# Patient Record
Sex: FEMALE | Race: OTHER | NOT HISPANIC OR LATINO
[De-identification: names, ages, dates, MRNs, and addresses within clinical notes are randomized per-mention and may not be internally consistent; named-entity substitution may affect disease eponyms.]

---

## 2019-10-04 ENCOUNTER — RESULT REVIEW (OUTPATIENT)
Age: 74
End: 2019-10-04

## 2019-10-04 ENCOUNTER — OUTPATIENT (OUTPATIENT)
Dept: OUTPATIENT SERVICES | Facility: HOSPITAL | Age: 74
LOS: 1 days | End: 2019-10-04
Payer: MEDICARE

## 2019-10-04 DIAGNOSIS — K63.5 POLYP OF COLON: ICD-10-CM

## 2019-10-04 DIAGNOSIS — C18.9 MALIGNANT NEOPLASM OF COLON, UNSPECIFIED: ICD-10-CM

## 2019-10-04 DIAGNOSIS — D12.6 BENIGN NEOPLASM OF COLON, UNSPECIFIED: ICD-10-CM

## 2019-10-04 PROCEDURE — 88321 CONSLTJ&REPRT SLD PREP ELSWR: CPT

## 2019-10-07 LAB — SURGICAL PATHOLOGY STUDY: SIGNIFICANT CHANGE UP

## 2019-10-09 VITALS
HEART RATE: 74 BPM | TEMPERATURE: 97 F | RESPIRATION RATE: 16 BRPM | WEIGHT: 197.98 LBS | OXYGEN SATURATION: 98 % | SYSTOLIC BLOOD PRESSURE: 150 MMHG | HEIGHT: 65 IN | DIASTOLIC BLOOD PRESSURE: 77 MMHG

## 2019-10-09 NOTE — PATIENT PROFILE ADULT - FALL HARM RISK
other/patient states she is "wobbly on feet" sometimes patient states she is "wobbly on feet" sometimes/surgery

## 2019-10-10 ENCOUNTER — INPATIENT (INPATIENT)
Facility: HOSPITAL | Age: 74
LOS: 6 days | Discharge: NURSING, REHAB OR LONG-TERM CARE | DRG: 375 | End: 2019-10-17
Attending: SURGERY | Admitting: SURGERY
Payer: MEDICARE

## 2019-10-10 ENCOUNTER — RESULT REVIEW (OUTPATIENT)
Age: 74
End: 2019-10-10

## 2019-10-10 DIAGNOSIS — Z98.890 OTHER SPECIFIED POSTPROCEDURAL STATES: Chronic | ICD-10-CM

## 2019-10-10 DIAGNOSIS — Z98.41 CATARACT EXTRACTION STATUS, RIGHT EYE: Chronic | ICD-10-CM

## 2019-10-10 LAB
ANION GAP SERPL CALC-SCNC: 16 MMOL/L — SIGNIFICANT CHANGE UP (ref 5–17)
APTT BLD: 31 SEC — SIGNIFICANT CHANGE UP (ref 27.5–36.3)
BASOPHILS # BLD AUTO: 0.02 K/UL — SIGNIFICANT CHANGE UP (ref 0–0.2)
BASOPHILS NFR BLD AUTO: 0.2 % — SIGNIFICANT CHANGE UP (ref 0–2)
BLD GP AB SCN SERPL QL: NEGATIVE — SIGNIFICANT CHANGE UP
BUN SERPL-MCNC: 12 MG/DL — SIGNIFICANT CHANGE UP (ref 7–23)
CALCIUM SERPL-MCNC: 7.7 MG/DL — LOW (ref 8.4–10.5)
CHLORIDE SERPL-SCNC: 98 MMOL/L — SIGNIFICANT CHANGE UP (ref 96–108)
CO2 SERPL-SCNC: 21 MMOL/L — LOW (ref 22–31)
CREAT SERPL-MCNC: 0.8 MG/DL — SIGNIFICANT CHANGE UP (ref 0.5–1.3)
EOSINOPHIL # BLD AUTO: 0 K/UL — SIGNIFICANT CHANGE UP (ref 0–0.5)
EOSINOPHIL NFR BLD AUTO: 0 % — SIGNIFICANT CHANGE UP (ref 0–6)
GLUCOSE SERPL-MCNC: 131 MG/DL — HIGH (ref 70–99)
HCT VFR BLD CALC: 28 % — LOW (ref 34.5–45)
HGB BLD-MCNC: 9.3 G/DL — LOW (ref 11.5–15.5)
IMM GRANULOCYTES NFR BLD AUTO: 0.8 % — SIGNIFICANT CHANGE UP (ref 0–1.5)
INR BLD: 1.12 — SIGNIFICANT CHANGE UP (ref 0.88–1.16)
LYMPHOCYTES # BLD AUTO: 0.86 K/UL — LOW (ref 1–3.3)
LYMPHOCYTES # BLD AUTO: 8.5 % — LOW (ref 13–44)
MCHC RBC-ENTMCNC: 30.7 PG — SIGNIFICANT CHANGE UP (ref 27–34)
MCHC RBC-ENTMCNC: 33.2 GM/DL — SIGNIFICANT CHANGE UP (ref 32–36)
MCV RBC AUTO: 92.4 FL — SIGNIFICANT CHANGE UP (ref 80–100)
MONOCYTES # BLD AUTO: 0.25 K/UL — SIGNIFICANT CHANGE UP (ref 0–0.9)
MONOCYTES NFR BLD AUTO: 2.5 % — SIGNIFICANT CHANGE UP (ref 2–14)
NEUTROPHILS # BLD AUTO: 8.85 K/UL — HIGH (ref 1.8–7.4)
NEUTROPHILS NFR BLD AUTO: 88 % — HIGH (ref 43–77)
NRBC # BLD: 0 /100 WBCS — SIGNIFICANT CHANGE UP (ref 0–0)
PLATELET # BLD AUTO: 139 K/UL — LOW (ref 150–400)
POTASSIUM SERPL-MCNC: 3.6 MMOL/L — SIGNIFICANT CHANGE UP (ref 3.5–5.3)
POTASSIUM SERPL-SCNC: 3.6 MMOL/L — SIGNIFICANT CHANGE UP (ref 3.5–5.3)
PROTHROM AB SERPL-ACNC: 12.7 SEC — SIGNIFICANT CHANGE UP (ref 10–12.9)
RBC # BLD: 3.03 M/UL — LOW (ref 3.8–5.2)
RBC # FLD: 12.8 % — SIGNIFICANT CHANGE UP (ref 10.3–14.5)
RH IG SCN BLD-IMP: POSITIVE — SIGNIFICANT CHANGE UP
SODIUM SERPL-SCNC: 135 MMOL/L — SIGNIFICANT CHANGE UP (ref 135–145)
WBC # BLD: 10.06 K/UL — SIGNIFICANT CHANGE UP (ref 3.8–10.5)
WBC # FLD AUTO: 10.06 K/UL — SIGNIFICANT CHANGE UP (ref 3.8–10.5)

## 2019-10-10 RX ORDER — BUPIVACAINE 13.3 MG/ML
20 INJECTION, SUSPENSION, LIPOSOMAL INFILTRATION ONCE
Refills: 0 | Status: DISCONTINUED | OUTPATIENT
Start: 2019-10-10 | End: 2019-10-17

## 2019-10-10 RX ORDER — SODIUM CHLORIDE 9 MG/ML
1000 INJECTION, SOLUTION INTRAVENOUS
Refills: 0 | Status: DISCONTINUED | OUTPATIENT
Start: 2019-10-10 | End: 2019-10-11

## 2019-10-10 RX ORDER — ACETAMINOPHEN 500 MG
650 TABLET ORAL ONCE
Refills: 0 | Status: COMPLETED | OUTPATIENT
Start: 2019-10-10 | End: 2019-10-10

## 2019-10-10 RX ORDER — GABAPENTIN 400 MG/1
600 CAPSULE ORAL ONCE
Refills: 0 | Status: COMPLETED | OUTPATIENT
Start: 2019-10-10 | End: 2019-10-10

## 2019-10-10 RX ORDER — ALVIMOPAN 12 MG/1
12 CAPSULE ORAL ONCE
Refills: 0 | Status: COMPLETED | OUTPATIENT
Start: 2019-10-10 | End: 2019-10-10

## 2019-10-10 RX ORDER — ACETAMINOPHEN 500 MG
650 TABLET ORAL EVERY 6 HOURS
Refills: 0 | Status: DISCONTINUED | OUTPATIENT
Start: 2019-10-10 | End: 2019-10-17

## 2019-10-10 RX ORDER — SODIUM CHLORIDE 9 MG/ML
1000 INJECTION, SOLUTION INTRAVENOUS ONCE
Refills: 0 | Status: COMPLETED | OUTPATIENT
Start: 2019-10-10 | End: 2019-10-10

## 2019-10-10 RX ORDER — ALVIMOPAN 12 MG/1
12 CAPSULE ORAL
Refills: 0 | Status: DISCONTINUED | OUTPATIENT
Start: 2019-10-10 | End: 2019-10-17

## 2019-10-10 RX ORDER — SIMVASTATIN 20 MG/1
1 TABLET, FILM COATED ORAL
Qty: 0 | Refills: 0 | DISCHARGE

## 2019-10-10 RX ORDER — ONDANSETRON 8 MG/1
4 TABLET, FILM COATED ORAL EVERY 6 HOURS
Refills: 0 | Status: DISCONTINUED | OUTPATIENT
Start: 2019-10-10 | End: 2019-10-17

## 2019-10-10 RX ORDER — ENOXAPARIN SODIUM 100 MG/ML
40 INJECTION SUBCUTANEOUS ONCE
Refills: 0 | Status: COMPLETED | OUTPATIENT
Start: 2019-10-10 | End: 2019-10-10

## 2019-10-10 RX ORDER — GABAPENTIN 400 MG/1
300 CAPSULE ORAL EVERY 6 HOURS
Refills: 0 | Status: DISCONTINUED | OUTPATIENT
Start: 2019-10-10 | End: 2019-10-17

## 2019-10-10 RX ORDER — HYDROMORPHONE HYDROCHLORIDE 2 MG/ML
30 INJECTION INTRAMUSCULAR; INTRAVENOUS; SUBCUTANEOUS
Refills: 0 | Status: DISCONTINUED | OUTPATIENT
Start: 2019-10-10 | End: 2019-10-13

## 2019-10-10 RX ORDER — ATENOLOL 25 MG/1
50 TABLET ORAL DAILY
Refills: 0 | Status: DISCONTINUED | OUTPATIENT
Start: 2019-10-10 | End: 2019-10-17

## 2019-10-10 RX ADMIN — SODIUM CHLORIDE 2000 MILLILITER(S): 9 INJECTION, SOLUTION INTRAVENOUS at 23:40

## 2019-10-10 RX ADMIN — ENOXAPARIN SODIUM 40 MILLIGRAM(S): 100 INJECTION SUBCUTANEOUS at 13:29

## 2019-10-10 RX ADMIN — ALVIMOPAN 12 MILLIGRAM(S): 12 CAPSULE ORAL at 13:29

## 2019-10-10 RX ADMIN — Medication 650 MILLIGRAM(S): at 13:28

## 2019-10-10 RX ADMIN — GABAPENTIN 600 MILLIGRAM(S): 400 CAPSULE ORAL at 13:28

## 2019-10-10 RX ADMIN — HYDROMORPHONE HYDROCHLORIDE 30 MILLILITER(S): 2 INJECTION INTRAMUSCULAR; INTRAVENOUS; SUBCUTANEOUS at 20:28

## 2019-10-10 RX ADMIN — Medication 650 MILLIGRAM(S): at 13:29

## 2019-10-10 NOTE — PROGRESS NOTE ADULT - SUBJECTIVE AND OBJECTIVE BOX
POST-OPERATIVE NOTE    Procedure: Laparoscopic assisted low anterior     Diagnosis/Indication: Colorectal cancer    Surgeon:     S: Pt with fresh clots of blood per rectum and scant liquid blood, for a total of two episodes while in PACU. Postop Hgb 9.3 from 11.5 preop. Pt does not have any other complaints and says that she feels at baseline. Denies Headache, dizziness, CP, SOB, abdominal pain, calf pain, nausea, vomiting. Pain controlled with medication.     O:  T(C): 36.9 (10-10-19 @ 18:45), Max: 36.9 (10-10-19 @ 18:45)  T(F): 98.4 (10-10-19 @ 18:45), Max: 98.4 (10-10-19 @ 18:45)  HR: 70 (10-10-19 @ 21:00) (62 - 70)  BP: 141/63 (10-10-19 @ 21:00) (141/63 - 180/74)  RR: 18 (10-10-19 @ 21:00) (13 - 18)  SpO2: 100% (10-10-19 @ 21:00) (100% - 100%)  Wt(kg): --                        9.3    10.06 )-----------( 139      ( 10 Oct 2019 21:15 )             28.0     10-10    135  |  98  |  12  ----------------------------<  131<H>  3.6   |  21<L>  |  0.80    Ca    7.7<L>      10 Oct 2019 21:15        Gen: NAD, resting comfortably in bed  C/V: NSR  Pulm: Nonlabored breathing, no respiratory distress  Abd: soft, RLQ mildly TTP, midline incision mildly saturated, other incisions c/d/i, bed with fresh blood clots near rectum, scant liquid bleeding from rectum noted  Extrem: WWP, no calf edema or tenderness, SCDs in place      A/P: 74y Female s/p above procedure  Diet: CLD  IVF: LR at 40cc/kg/hr  Pain/nausea control  DVT ppx: scds  Dispo plan: tele

## 2019-10-10 NOTE — H&P ADULT - HISTORY OF PRESENT ILLNESS
This is a 75 yo F with hx of HTN, HLD and hemorrhoids who had some BPR (which she attributed to hemorrhoids) this summer which prompted her to receive her first colonoscopy ever in September which showed a sigmoid/rectal mass. Metastatic workup was negative. She tolerated prep for today.     She worked as a  for 35 years (recently retired) and can walk more than 10 blocks. She has hip and knee pain which prevents her from climbing stairs but she does not have any chest pain or shortness of breath with exertion. In 2006 she underwent negative cardiac cath and has never had a cardiac event.

## 2019-10-10 NOTE — H&P ADULT - ASSESSMENT
73 yo F hx HTN, HLD with sigmoid mass here for resection.   -ERAS  -Colorectal bundle  -OR for resection

## 2019-10-10 NOTE — H&P ADULT - NSICDXPASTSURGICALHX_GEN_ALL_CORE_FT
PAST SURGICAL HISTORY:  History of right cataract extraction     History of surgery basal cell removal from forehead    History of surgery Cardiac catherization

## 2019-10-10 NOTE — H&P ADULT - NSICDXPASTMEDICALHX_GEN_ALL_CORE_FT
PAST MEDICAL HISTORY:  Basal cell carcinoma of forehead     History of otitis bilateral    Hypertension, unspecified type     Malignant neoplasm of descending colon     Other hyperlipidemia

## 2019-10-10 NOTE — BRIEF OPERATIVE NOTE - OPERATION/FINDINGS
Patient with biopsy-proven tattood sigmoid lesion. Diagnostic laparoscopy easily visualized lesion. Lysis of adhesions. Mobilized white line on left, took splenic flexure. Took sigmoid over 10cm distal to lesion with endogia stapler. Vessel taken with vascular load. Meso taken with ligasure. Sigmoid brought through supraumbilical incision. Purse sarah used for 28 EEA anvil placed. ICG good perfusion. Rigid proctoscopy leak test negative. Exparel injected. RLQ port site endoclosed with 0 vicryl tie. Exparel injected in TAP block fashion. Closing tray used.

## 2019-10-11 LAB
ANION GAP SERPL CALC-SCNC: 14 MMOL/L — SIGNIFICANT CHANGE UP (ref 5–17)
BASOPHILS # BLD AUTO: 0.01 K/UL — SIGNIFICANT CHANGE UP (ref 0–0.2)
BASOPHILS NFR BLD AUTO: 0.1 % — SIGNIFICANT CHANGE UP (ref 0–2)
BLD GP AB SCN SERPL QL: NEGATIVE — SIGNIFICANT CHANGE UP
BUN SERPL-MCNC: 17 MG/DL — SIGNIFICANT CHANGE UP (ref 7–23)
CALCIUM SERPL-MCNC: 7.3 MG/DL — LOW (ref 8.4–10.5)
CHLORIDE SERPL-SCNC: 100 MMOL/L — SIGNIFICANT CHANGE UP (ref 96–108)
CO2 SERPL-SCNC: 21 MMOL/L — LOW (ref 22–31)
CREAT SERPL-MCNC: 0.89 MG/DL — SIGNIFICANT CHANGE UP (ref 0.5–1.3)
EOSINOPHIL # BLD AUTO: 0 K/UL — SIGNIFICANT CHANGE UP (ref 0–0.5)
EOSINOPHIL NFR BLD AUTO: 0 % — SIGNIFICANT CHANGE UP (ref 0–6)
FERRITIN SERPL-MCNC: 110 NG/ML — SIGNIFICANT CHANGE UP (ref 15–150)
FIBRINOGEN PPP-MCNC: 306 MG/DL — SIGNIFICANT CHANGE UP (ref 258–438)
GLUCOSE SERPL-MCNC: 132 MG/DL — HIGH (ref 70–99)
HCT VFR BLD CALC: 22.9 % — LOW (ref 34.5–45)
HCT VFR BLD CALC: 23.1 % — LOW (ref 34.5–45)
HCT VFR BLD CALC: 23.8 % — LOW (ref 34.5–45)
HCT VFR BLD CALC: 25.4 % — LOW (ref 34.5–45)
HGB BLD-MCNC: 7.4 G/DL — LOW (ref 11.5–15.5)
HGB BLD-MCNC: 7.7 G/DL — LOW (ref 11.5–15.5)
HGB BLD-MCNC: 8 G/DL — LOW (ref 11.5–15.5)
HGB BLD-MCNC: 8 G/DL — LOW (ref 11.5–15.5)
IMM GRANULOCYTES NFR BLD AUTO: 0.5 % — SIGNIFICANT CHANGE UP (ref 0–1.5)
IRON SATN MFR SERPL: 16 % — SIGNIFICANT CHANGE UP (ref 14–50)
IRON SATN MFR SERPL: 32 UG/DL — SIGNIFICANT CHANGE UP (ref 30–160)
LYMPHOCYTES # BLD AUTO: 0.9 K/UL — LOW (ref 1–3.3)
LYMPHOCYTES # BLD AUTO: 8.5 % — LOW (ref 13–44)
MAGNESIUM SERPL-MCNC: 0.7 MG/DL — CRITICAL LOW (ref 1.6–2.6)
MCHC RBC-ENTMCNC: 29.8 PG — SIGNIFICANT CHANGE UP (ref 27–34)
MCHC RBC-ENTMCNC: 30.4 PG — SIGNIFICANT CHANGE UP (ref 27–34)
MCHC RBC-ENTMCNC: 30.4 PG — SIGNIFICANT CHANGE UP (ref 27–34)
MCHC RBC-ENTMCNC: 30.6 PG — SIGNIFICANT CHANGE UP (ref 27–34)
MCHC RBC-ENTMCNC: 31.5 GM/DL — LOW (ref 32–36)
MCHC RBC-ENTMCNC: 32.3 GM/DL — SIGNIFICANT CHANGE UP (ref 32–36)
MCHC RBC-ENTMCNC: 33.3 GM/DL — SIGNIFICANT CHANGE UP (ref 32–36)
MCHC RBC-ENTMCNC: 33.6 GM/DL — SIGNIFICANT CHANGE UP (ref 32–36)
MCV RBC AUTO: 90.5 FL — SIGNIFICANT CHANGE UP (ref 80–100)
MCV RBC AUTO: 91.7 FL — SIGNIFICANT CHANGE UP (ref 80–100)
MCV RBC AUTO: 92.3 FL — SIGNIFICANT CHANGE UP (ref 80–100)
MCV RBC AUTO: 96.6 FL — SIGNIFICANT CHANGE UP (ref 80–100)
MONOCYTES # BLD AUTO: 0.2 K/UL — SIGNIFICANT CHANGE UP (ref 0–0.9)
MONOCYTES NFR BLD AUTO: 1.9 % — LOW (ref 2–14)
NEUTROPHILS # BLD AUTO: 9.43 K/UL — HIGH (ref 1.8–7.4)
NEUTROPHILS NFR BLD AUTO: 89 % — HIGH (ref 43–77)
NRBC # BLD: 0 /100 WBCS — SIGNIFICANT CHANGE UP (ref 0–0)
PHOSPHATE SERPL-MCNC: 3.7 MG/DL — SIGNIFICANT CHANGE UP (ref 2.5–4.5)
PLATELET # BLD AUTO: 109 K/UL — LOW (ref 150–400)
PLATELET # BLD AUTO: 127 K/UL — LOW (ref 150–400)
PLATELET # BLD AUTO: 131 K/UL — LOW (ref 150–400)
PLATELET # BLD AUTO: 142 K/UL — LOW (ref 150–400)
POTASSIUM SERPL-MCNC: 3.9 MMOL/L — SIGNIFICANT CHANGE UP (ref 3.5–5.3)
POTASSIUM SERPL-SCNC: 3.9 MMOL/L — SIGNIFICANT CHANGE UP (ref 3.5–5.3)
RBC # BLD: 2.48 M/UL — LOW (ref 3.8–5.2)
RBC # BLD: 2.52 M/UL — LOW (ref 3.8–5.2)
RBC # BLD: 2.63 M/UL — LOW (ref 3.8–5.2)
RBC # BLD: 2.63 M/UL — LOW (ref 3.8–5.2)
RBC # FLD: 12.8 % — SIGNIFICANT CHANGE UP (ref 10.3–14.5)
RBC # FLD: 13.1 % — SIGNIFICANT CHANGE UP (ref 10.3–14.5)
RBC # FLD: 13.6 % — SIGNIFICANT CHANGE UP (ref 10.3–14.5)
RBC # FLD: 14.2 % — SIGNIFICANT CHANGE UP (ref 10.3–14.5)
RH IG SCN BLD-IMP: POSITIVE — SIGNIFICANT CHANGE UP
SODIUM SERPL-SCNC: 135 MMOL/L — SIGNIFICANT CHANGE UP (ref 135–145)
TIBC SERPL-MCNC: 195 UG/DL — LOW (ref 220–430)
UIBC SERPL-MCNC: 163 UG/DL — SIGNIFICANT CHANGE UP (ref 110–370)
WBC # BLD: 10.59 K/UL — HIGH (ref 3.8–10.5)
WBC # BLD: 11.39 K/UL — HIGH (ref 3.8–10.5)
WBC # BLD: 8.45 K/UL — SIGNIFICANT CHANGE UP (ref 3.8–10.5)
WBC # BLD: 9.09 K/UL — SIGNIFICANT CHANGE UP (ref 3.8–10.5)
WBC # FLD AUTO: 10.59 K/UL — HIGH (ref 3.8–10.5)
WBC # FLD AUTO: 11.39 K/UL — HIGH (ref 3.8–10.5)
WBC # FLD AUTO: 8.45 K/UL — SIGNIFICANT CHANGE UP (ref 3.8–10.5)
WBC # FLD AUTO: 9.09 K/UL — SIGNIFICANT CHANGE UP (ref 3.8–10.5)

## 2019-10-11 RX ORDER — MAGNESIUM SULFATE 500 MG/ML
2 VIAL (ML) INJECTION EVERY 6 HOURS
Refills: 0 | Status: COMPLETED | OUTPATIENT
Start: 2019-10-11 | End: 2019-10-12

## 2019-10-11 RX ORDER — SODIUM CHLORIDE 9 MG/ML
1000 INJECTION, SOLUTION INTRAVENOUS
Refills: 0 | Status: DISCONTINUED | OUTPATIENT
Start: 2019-10-11 | End: 2019-10-16

## 2019-10-11 RX ADMIN — GABAPENTIN 300 MILLIGRAM(S): 400 CAPSULE ORAL at 06:58

## 2019-10-11 RX ADMIN — Medication 650 MILLIGRAM(S): at 17:47

## 2019-10-11 RX ADMIN — Medication 50 GRAM(S): at 23:15

## 2019-10-11 RX ADMIN — ALVIMOPAN 12 MILLIGRAM(S): 12 CAPSULE ORAL at 06:57

## 2019-10-11 RX ADMIN — GABAPENTIN 300 MILLIGRAM(S): 400 CAPSULE ORAL at 17:46

## 2019-10-11 RX ADMIN — SODIUM CHLORIDE 125 MILLILITER(S): 9 INJECTION, SOLUTION INTRAVENOUS at 17:46

## 2019-10-11 NOTE — CONSULT NOTE ADULT - SUBJECTIVE AND OBJECTIVE BOX
HPI: 75 yo F with hx of HTN, HLD and hemorrhoids who developed BRBPR over the summer which she thought was from hemorrhoids, however it persisted.  She had her first colonoscopy ever in September which showed a sigmoid/rectal mass w/bx showing adenocarcinoma.  6 polyps were also removed during the procedure. Metastatic workup was negative as per notes.  10/10 she was admitted for LAR.  She reports that since yesterday she has thought she was going to have a BM 7 times but has just passed blood and clots.   She otherwise feels fine - no SOB, dizziness, lightheadedness.  Some abdominal pain at surgical site that she says is tolerable.    ROS: 12 pt ROS performed, neg except as per HPI.    PMH:  Basal cell carcinoma of forehead   otitis bilateral  Hypertension  Malignant neoplasm of descending colon   Other hyperlipidemia.     PSH:   History of right cataract extraction   History of surgery basal cell removal from forehead  History of surgery Cardiac catherization - negative    SH:  Former smoker-- quit 30 years ago  Retired .    Allergies: none    Home Medications:   * Patient Currently Takes Medications as of 10-Oct-2019 11:26 documented in Structured Notes  · 	enalapril 10 mg oral tablet: Last Dose Taken: 10-Oct-2019 AM, 1 tab(s) orally once a day  · 	atenolol 50 mg oral tablet: Last Dose Taken: 10-Oct-2019 AM, 1 tab(s) orally once a day  · 	simvastatin 40 mg oral tablet: Last Dose Taken: 09-Oct-2019 PM, 1 tab(s) orally once a day (at bedtime)  · 	Aspirin Enteric Coated 81 mg oral delayed release tablet: Last Dose Taken: 04-Oct-2019 , 1 tab(s) orally once a day    Current meds  MEDICATIONS  (STANDING):  acetaminophen   Tablet .. 650 milliGRAM(s) Oral every 6 hours  alvimopan 12 milliGRAM(s) Oral two times a day  ATENolol  Tablet 50 milliGRAM(s) Oral daily  BUpivacaine liposome 1.3% Injectable (no eMAR) 20 milliLiter(s) Local Injection once  enalapril 10 milliGRAM(s) Oral daily  gabapentin 300 milliGRAM(s) Oral every 6 hours  HYDROmorphone PCA (1 mG/mL) 30 milliLiter(s) PCA Continuous PCA Continuous  lactated ringers. 1000 milliLiter(s) (125 mL/Hr) IV Continuous <Continuous>    MEDICATIONS  (PRN):  ondansetron Injectable 4 milliGRAM(s) IV Push every 6 hours PRN Nausea    Exam:  Vital Signs Last 24 Hrs  T(C): 36.6 (11 Oct 2019 14:06), Max: 36.9 (10 Oct 2019 18:45)  T(F): 97.9 (11 Oct 2019 14:06), Max: 98.4 (10 Oct 2019 18:45)  HR: 84 (11 Oct 2019 06:26) (62 - 122)  BP: 108/65 (11 Oct 2019 06:26) (78/45 - 180/74)  BP(mean): 81 (11 Oct 2019 06:26) (59 - 109)  RR: 18 (11 Oct 2019 06:26) (13 - 20)  SpO2: 97% (11 Oct 2019 06:26) (93% - 100%)    Gen: well appearing, NAD while lying down, pale  HEENT: MMM  Card: rrr, s1/s2, no mrg  Lungs: ctab  Abd: +BS, soft, diffusely with mild tenderness but no rebound or guarding  LE: no edema  Neuro: aaox3, moving all extremities    Labs:    Hb 11.5 --> 9.5 --> 8 --> 7.7 --> 8  PT/PTT/INR normal  fibrinogen 306  iron studies normal                        8.0    9.09  )-----------( 127      ( 11 Oct 2019 11:40 )             23.8     10-11    135  |  100  |  17  ----------------------------<  132<H>  3.9   |  21<L>  |  0.89    Ca    7.3<L>      11 Oct 2019 11:40      Path:  Descending colon polyp, polypectomy ([id number]):  - Fragments of hyperplastic polyp.    Descending colon polyp, polypectomy ([id number]):  - Tubular adenoma.    Descending colon polyp, polypectomy ([id number]):  - Tubulovillous adenoma.    Descending colon polyp, polypectomy ([id number]):  - Tubular adenoma.    Descending colon polyp, polypectomy ([id number]):  - Tubular adenoma.    Descending colon polyp, polypectomy ([id number]):  - Tubulovillous adenoma.    Descending colon mass, biopsy ([id number]):  - Superficial fragments of colonic mucosa with the least  intramucosal adenocarcinoma.

## 2019-10-11 NOTE — PROGRESS NOTE ADULT - SUBJECTIVE AND OBJECTIVE BOX
STATUS POST: POD # 1 s/p Lap LAR     INTERVAL HPI/OVERNIGHT EVENTS: Paietn p     SUBJECTIVE:     ATENolol  Tablet 50 milliGRAM(s) Oral daily  enalapril 10 milliGRAM(s) Oral daily      Vital Signs Last 24 Hrs  T(C): 36.5 (11 Oct 2019 03:45), Max: 36.9 (10 Oct 2019 18:45)  T(F): 97.7 (11 Oct 2019 03:45), Max: 98.4 (10 Oct 2019 18:45)  HR: 84 (11 Oct 2019 06:26) (62 - 122)  BP: 108/65 (11 Oct 2019 06:26) (78/45 - 180/74)  BP(mean): 81 (11 Oct 2019 06:26) (59 - 109)  RR: 18 (11 Oct 2019 06:26) (13 - 20)  SpO2: 97% (11 Oct 2019 06:26) (93% - 100%)  I&O's Detail    10 Oct 2019 07:01  -  11 Oct 2019 07:00  --------------------------------------------------------  IN:    lactated ringers.: 625 mL    lactated ringers.: 120 mL    Oral Fluid: 120 mL    Packed Red Blood Cells: 250 mL  Total IN: 1115 mL    OUT:    Indwelling Catheter - Urethral: 775 mL  Total OUT: 775 mL    Total NET: 340 mL          General: NAD, resting comfortably in bed  C/V: NSR  Pulm: Nonlabored breathing, no respiratory distress  Abd: soft, NT/ND.  Extrem: WWP, no edema,        LABS:                        7.7    8.45  )-----------( 131      ( 11 Oct 2019 06:37 )             23.1     10-10    135  |  98  |  12  ----------------------------<  131<H>  3.6   |  21<L>  |  0.80    Ca    7.7<L>      10 Oct 2019 21:15      PT/INR - ( 10 Oct 2019 22:45 )   PT: 12.7 sec;   INR: 1.12          PTT - ( 10 Oct 2019 22:45 )  PTT:31.0 sec      RADIOLOGY & ADDITIONAL STUDIES: STATUS POST: POD # 1 s/p Lap LAR     INTERVAL HPI/OVERNIGHT EVENTS: Received pre-op Lovenox. Patient passed clot per rectum x 3 so Hgb collected, 8.0 @ midnight. Repeat CBC in am 7.0, attending aware.      SUBJECTIVE: this morning she feels well, resting comfortably in bed. Denies weakness, sob,    ATENolol  Tablet 50 milliGRAM(s) Oral daily  enalapril 10 milliGRAM(s) Oral daily      Vital Signs Last 24 Hrs  T(C): 36.5 (11 Oct 2019 03:45), Max: 36.9 (10 Oct 2019 18:45)  T(F): 97.7 (11 Oct 2019 03:45), Max: 98.4 (10 Oct 2019 18:45)  HR: 84 (11 Oct 2019 06:26) (62 - 122)  BP: 108/65 (11 Oct 2019 06:26) (78/45 - 180/74)  BP(mean): 81 (11 Oct 2019 06:26) (59 - 109)  RR: 18 (11 Oct 2019 06:26) (13 - 20)  SpO2: 97% (11 Oct 2019 06:26) (93% - 100%)  I&O's Detail    10 Oct 2019 07:01  -  11 Oct 2019 07:00  --------------------------------------------------------  IN:    lactated ringers.: 625 mL    lactated ringers.: 120 mL    Oral Fluid: 120 mL    Packed Red Blood Cells: 250 mL  Total IN: 1115 mL    OUT:    Indwelling Catheter - Urethral: 775 mL  Total OUT: 775 mL    Total NET: 340 mL          General: NAD, resting comfortably in bed  C/V: NSR  Pulm: Nonlabored breathing, no respiratory distress  Abd: soft, NT/ND, dressing saturated with blood, changed.   : Kelly in place  Extrem: WWP, no edema,        LABS:                        7.7    8.45  )-----------( 131      ( 11 Oct 2019 06:37 )             23.1     10-10    135  |  98  |  12  ----------------------------<  131<H>  3.6   |  21<L>  |  0.80    Ca    7.7<L>      10 Oct 2019 21:15      PT/INR - ( 10 Oct 2019 22:45 )   PT: 12.7 sec;   INR: 1.12          PTT - ( 10 Oct 2019 22:45 )  PTT:31.0 sec      RADIOLOGY & ADDITIONAL STUDIES:

## 2019-10-11 NOTE — PROGRESS NOTE ADULT - ASSESSMENT
75 yo F hx HTN, HLD with sigmoid mass here for resection, s/p LAR. Postop course c/b anemia 2/2 post surgical acute blood loss anemia vs active bleed.     -1 unit PRBC  -cbc Q6hr  -ERAS  -Colorectal bundle

## 2019-10-11 NOTE — CHART NOTE - NSCHARTNOTEFT_GEN_A_CORE
Performed LAR earlier this evening. Patient received pre-op lovenox. Pre op hgb 11.5.  Patient passed clot per rectum postop x 3 and so hgb collected-- now 8.   Patient vitally stable and nontachycardic. EKG performed bedside and pulse checked-- in 70s. BP stable.   Patient feels fine. Will increase fluids as only at 40cc/hr.   Repeat CBC 5am. Spoke with attending.

## 2019-10-11 NOTE — CONSULT NOTE ADULT - ASSESSMENT
73 yo F with hx of HTN, HLD hemorrhoids, and newly dx'd sigmoid adeno s/p LAR with worsening anemia.  Patient is passing blood/clots.  -Labs w/o iron deficiency or elevated ferritin to suggest anemia of chronic inflammation.  -f/u bilis, hapto, and LDH to r/o hemolysis.  However, suspect the anemia is all from rectal bleeding.  -would monitor CBC q8 hrs and transfuse to goal Hb of 7.  If plan to go back to OR transfuse to Hb of 8 prior.  -May require adjuvant treatment for sigmoid adeno but will depend on final path from surgery.  -We will continue to follow.  Dr. MALAGON is covering over the weekend - will discuss case with him.    Thank you for allowing me to participate in her care.  Please feel free to call w/any questions.    Nicole Ordonez MD  [facility] Attending  [phone]

## 2019-10-12 LAB
ANION GAP SERPL CALC-SCNC: 10 MMOL/L — SIGNIFICANT CHANGE UP (ref 5–17)
BUN SERPL-MCNC: 15 MG/DL — SIGNIFICANT CHANGE UP (ref 7–23)
CALCIUM SERPL-MCNC: 7.4 MG/DL — LOW (ref 8.4–10.5)
CHLORIDE SERPL-SCNC: 101 MMOL/L — SIGNIFICANT CHANGE UP (ref 96–108)
CO2 SERPL-SCNC: 24 MMOL/L — SIGNIFICANT CHANGE UP (ref 22–31)
CREAT SERPL-MCNC: 0.65 MG/DL — SIGNIFICANT CHANGE UP (ref 0.5–1.3)
GLUCOSE SERPL-MCNC: 115 MG/DL — HIGH (ref 70–99)
HAPTOGLOB SERPL-MCNC: 138 MG/DL — SIGNIFICANT CHANGE UP (ref 34–200)
HCT VFR BLD CALC: 26.1 % — LOW (ref 34.5–45)
HCT VFR BLD CALC: 26.3 % — LOW (ref 34.5–45)
HCT VFR BLD CALC: 27.8 % — LOW (ref 34.5–45)
HGB BLD-MCNC: 8.7 G/DL — LOW (ref 11.5–15.5)
HGB BLD-MCNC: 8.8 G/DL — LOW (ref 11.5–15.5)
HGB BLD-MCNC: 9.1 G/DL — LOW (ref 11.5–15.5)
LDH SERPL L TO P-CCNC: 240 U/L — SIGNIFICANT CHANGE UP (ref 50–242)
MAGNESIUM SERPL-MCNC: 1.2 MG/DL — LOW (ref 1.6–2.6)
MCHC RBC-ENTMCNC: 29.6 PG — SIGNIFICANT CHANGE UP (ref 27–34)
MCHC RBC-ENTMCNC: 29.8 PG — SIGNIFICANT CHANGE UP (ref 27–34)
MCHC RBC-ENTMCNC: 30.1 PG — SIGNIFICANT CHANGE UP (ref 27–34)
MCHC RBC-ENTMCNC: 32.7 GM/DL — SIGNIFICANT CHANGE UP (ref 32–36)
MCHC RBC-ENTMCNC: 33.3 GM/DL — SIGNIFICANT CHANGE UP (ref 32–36)
MCHC RBC-ENTMCNC: 33.5 GM/DL — SIGNIFICANT CHANGE UP (ref 32–36)
MCV RBC AUTO: 89.4 FL — SIGNIFICANT CHANGE UP (ref 80–100)
MCV RBC AUTO: 90.1 FL — SIGNIFICANT CHANGE UP (ref 80–100)
MCV RBC AUTO: 90.6 FL — SIGNIFICANT CHANGE UP (ref 80–100)
NRBC # BLD: 0 /100 WBCS — SIGNIFICANT CHANGE UP (ref 0–0)
NT-PROBNP SERPL-SCNC: 425 PG/ML — HIGH (ref 0–300)
PHOSPHATE SERPL-MCNC: 3.2 MG/DL — SIGNIFICANT CHANGE UP (ref 2.5–4.5)
PLATELET # BLD AUTO: 148 K/UL — LOW (ref 150–400)
PLATELET # BLD AUTO: 152 K/UL — SIGNIFICANT CHANGE UP (ref 150–400)
PLATELET # BLD AUTO: 160 K/UL — SIGNIFICANT CHANGE UP (ref 150–400)
POTASSIUM SERPL-MCNC: 3.8 MMOL/L — SIGNIFICANT CHANGE UP (ref 3.5–5.3)
POTASSIUM SERPL-SCNC: 3.8 MMOL/L — SIGNIFICANT CHANGE UP (ref 3.5–5.3)
RBC # BLD: 2.92 M/UL — LOW (ref 3.8–5.2)
RBC # BLD: 2.92 M/UL — LOW (ref 3.8–5.2)
RBC # BLD: 3.07 M/UL — LOW (ref 3.8–5.2)
RBC # FLD: 14.5 % — SIGNIFICANT CHANGE UP (ref 10.3–14.5)
RBC # FLD: 14.8 % — HIGH (ref 10.3–14.5)
RBC # FLD: 14.8 % — HIGH (ref 10.3–14.5)
SODIUM SERPL-SCNC: 135 MMOL/L — SIGNIFICANT CHANGE UP (ref 135–145)
TROPONIN T SERPL-MCNC: <0.01 NG/ML — SIGNIFICANT CHANGE UP (ref 0–0.01)
WBC # BLD: 10.01 K/UL — SIGNIFICANT CHANGE UP (ref 3.8–10.5)
WBC # BLD: 10.69 K/UL — HIGH (ref 3.8–10.5)
WBC # BLD: 10.9 K/UL — HIGH (ref 3.8–10.5)
WBC # FLD AUTO: 10.01 K/UL — SIGNIFICANT CHANGE UP (ref 3.8–10.5)
WBC # FLD AUTO: 10.69 K/UL — HIGH (ref 3.8–10.5)
WBC # FLD AUTO: 10.9 K/UL — HIGH (ref 3.8–10.5)

## 2019-10-12 PROCEDURE — 71045 X-RAY EXAM CHEST 1 VIEW: CPT | Mod: 26

## 2019-10-12 PROCEDURE — 71045 X-RAY EXAM CHEST 1 VIEW: CPT | Mod: 26,77

## 2019-10-12 RX ORDER — MAGNESIUM SULFATE 500 MG/ML
2 VIAL (ML) INJECTION
Refills: 0 | Status: COMPLETED | OUTPATIENT
Start: 2019-10-12 | End: 2019-10-12

## 2019-10-12 RX ORDER — LABETALOL HCL 100 MG
10 TABLET ORAL ONCE
Refills: 0 | Status: COMPLETED | OUTPATIENT
Start: 2019-10-12 | End: 2019-10-12

## 2019-10-12 RX ORDER — IPRATROPIUM/ALBUTEROL SULFATE 18-103MCG
3 AEROSOL WITH ADAPTER (GRAM) INHALATION ONCE
Refills: 0 | Status: COMPLETED | OUTPATIENT
Start: 2019-10-12 | End: 2019-10-12

## 2019-10-12 RX ORDER — HYDRALAZINE HCL 50 MG
10 TABLET ORAL ONCE
Refills: 0 | Status: COMPLETED | OUTPATIENT
Start: 2019-10-12 | End: 2019-10-12

## 2019-10-12 RX ORDER — POTASSIUM CHLORIDE 20 MEQ
10 PACKET (EA) ORAL ONCE
Refills: 0 | Status: COMPLETED | OUTPATIENT
Start: 2019-10-12 | End: 2019-10-12

## 2019-10-12 RX ORDER — LABETALOL HCL 100 MG
5 TABLET ORAL ONCE
Refills: 0 | Status: COMPLETED | OUTPATIENT
Start: 2019-10-12 | End: 2019-10-12

## 2019-10-12 RX ORDER — HYDRALAZINE HCL 50 MG
10 TABLET ORAL ONCE
Refills: 0 | Status: DISCONTINUED | OUTPATIENT
Start: 2019-10-12 | End: 2019-10-12

## 2019-10-12 RX ORDER — METOPROLOL TARTRATE 50 MG
5 TABLET ORAL ONCE
Refills: 0 | Status: COMPLETED | OUTPATIENT
Start: 2019-10-12 | End: 2019-10-12

## 2019-10-12 RX ORDER — ATENOLOL 25 MG/1
50 TABLET ORAL ONCE
Refills: 0 | Status: COMPLETED | OUTPATIENT
Start: 2019-10-12 | End: 2019-10-12

## 2019-10-12 RX ADMIN — Medication 10 MILLIGRAM(S): at 17:17

## 2019-10-12 RX ADMIN — ATENOLOL 50 MILLIGRAM(S): 25 TABLET ORAL at 04:05

## 2019-10-12 RX ADMIN — Medication 10 MILLIGRAM(S): at 23:56

## 2019-10-12 RX ADMIN — Medication 100 MILLIEQUIVALENT(S): at 11:14

## 2019-10-12 RX ADMIN — ALVIMOPAN 12 MILLIGRAM(S): 12 CAPSULE ORAL at 10:02

## 2019-10-12 RX ADMIN — Medication 5 MILLIGRAM(S): at 04:04

## 2019-10-12 RX ADMIN — Medication 50 GRAM(S): at 13:05

## 2019-10-12 RX ADMIN — Medication 10 MILLIGRAM(S): at 03:33

## 2019-10-12 RX ADMIN — Medication 3 MILLILITER(S): at 04:07

## 2019-10-12 RX ADMIN — HYDROMORPHONE HYDROCHLORIDE 30 MILLILITER(S): 2 INJECTION INTRAMUSCULAR; INTRAVENOUS; SUBCUTANEOUS at 16:29

## 2019-10-12 RX ADMIN — Medication 50 GRAM(S): at 04:15

## 2019-10-12 RX ADMIN — Medication 10 MILLIGRAM(S): at 12:25

## 2019-10-12 RX ADMIN — ALVIMOPAN 12 MILLIGRAM(S): 12 CAPSULE ORAL at 21:59

## 2019-10-12 RX ADMIN — Medication 5 MILLIGRAM(S): at 02:33

## 2019-10-12 RX ADMIN — Medication 10 MILLIGRAM(S): at 10:02

## 2019-10-12 RX ADMIN — Medication 50 GRAM(S): at 10:15

## 2019-10-12 NOTE — PROGRESS NOTE ADULT - ASSESSMENT
73 yo F with hx of HTN, HLD hemorrhoids, and newly dx'd sigmoid adeno s/p LAR with worsening anemia.  Patient is passing blood/clots.  -Labs w/o iron deficiency or elevated ferritin to suggest anemia of chronic inflammation.  -throughout night of 10/11 into 10/12 had sigmoidoscopy- no bleed. Pt then taken for exploratory laparoscopy- no bleed found.  -reordered ldh and haptoglobin.   -would monitor CBC q8 hrs and transfuse to goal Hb of 7.  If plan to go back to OR transfuse to Hb of 8 prior.  -so far has received 3 units of PRBC on 10/11/19, 1 unit of PRBC on 10/12/19, and 1 unit of plt on 10/12/19.  -if further transfusions are necessary monitor for dilutional coagulopathy (decline in plts and abnormal coags).  -awaiting final path from LAR      Thank you for allowing me to participate in her care.  Please feel free to call w/any questions.    Gary Hansen MD covering for Nicole Ordonez MD  [facility] Attending  [phone]

## 2019-10-12 NOTE — PROGRESS NOTE ADULT - SUBJECTIVE AND OBJECTIVE BOX
STATUS POST:  Diagnostic laparoscopy  Flexible sigmoidoscopy  Laparoscopic assisted low anterior resection    POST OPERATIVE DAY #: 2 from LAR c/b LGIB w/ RTOR now POD 0 s/p flex sig and diagnostic lap.     Patient seen and examined at bedside. C/o of mild abdominal pain, mild nausea w/o vomiting, no gas or BM, no blood per rectum post-op.     OBJECTIVE:    Vital Signs Last 24 Hrs  T(C): 36.9 (12 Oct 2019 04:01), Max: 37.7 (11 Oct 2019 17:33)  T(F): 98.5 (12 Oct 2019 04:01), Max: 99.9 (11 Oct 2019 17:33)  HR: 104 (12 Oct 2019 06:54) (84 - 120)  BP: 159/70 (12 Oct 2019 06:54) (129/61 - 221/82)  BP(mean): 101 (12 Oct 2019 06:54) (88 - 124)  RR: 18 (12 Oct 2019 06:54) (17 - 53)  SpO2: 96% (12 Oct 2019 06:54) (94% - 100%)    I&O's Summary    11 Oct 2019 07:01  -  12 Oct 2019 07:00  --------------------------------------------------------  IN: 2300 mL / OUT: 525 mL / NET: 1775 mL        Physical Exam:  General Appearance: non-acute, mild distress  HEENT: Grossly symmetric  Chest: Non labored breathing  CV: Pulse regular presently  Abdomen: Soft, moderately tender, mild distention, incisions clean and dry and intact  Extremities: Grossly symmetric, no swelling, warm.     LABS:                        9.1    10.69 )-----------( 152      ( 12 Oct 2019 03:19 )             27.8     10-12    135  |  101  |  15  ----------------------------<  115<H>  3.8   |  24  |  0.65    Ca    7.4<L>      12 Oct 2019 03:19  Phos  3.2     10-12  Mg     1.2     10-12      PT/INR - ( 10 Oct 2019 22:45 )   PT: 12.7 sec;   INR: 1.12          PTT - ( 10 Oct 2019 22:45 )  PTT:31.0 sec      RADIOLOGY & ADDITIONAL STUDIES:

## 2019-10-12 NOTE — BRIEF OPERATIVE NOTE - NSICDXBRIEFPOSTOP_GEN_ALL_CORE_FT
POST-OP DIAGNOSIS:  Acute blood loss anemia 12-Oct-2019 02:28:51  Chaz Beaver
POST-OP DIAGNOSIS:  Colorectal cancer 10-Oct-2019 18:25:38  Cassandra Leach

## 2019-10-12 NOTE — BRIEF OPERATIVE NOTE - NSICDXBRIEFPROCEDURE_GEN_ALL_CORE_FT
PROCEDURES:  Diagnostic laparoscopy 12-Oct-2019 02:28:17  Chaz Beaver  Flexible sigmoidoscopy 12-Oct-2019 02:28:07  Chaz Beaver
PROCEDURES:  Laparoscopic assisted low anterior resection 10-Oct-2019 18:25:16  Cassandra Leach

## 2019-10-12 NOTE — PROGRESS NOTE ADULT - ASSESSMENT
POD 2 from LAR c/b LGIB for which she received 3 Units of PRBC w/ RTOR now POD 0 s/p flex sig and diagnostic lap, which revealed no acute bleeding, c/b vital sign instability in PACU BP 220s, Tachy 120s w/ RR in 50s and 02 sat >90 w/ nonrebreather and eventually Nasal cannula, diaphoretic and in distress on post-op exam, post-op Hb 9.1 w/o additional LGIB, CXR shows Left sided effusions.     CV: Concern for cardiac etiology,  will consider Echo. Atenolol 50 PO qd and Enalapril 10mg qd for BP control. Concern for overload, limiting LR to 50cc/hr. Holding Lovenox.     Resp: Concern for Infectious etiology, will trend WBC and temperature and consider Abx.     GI: q6 CBC for to monitor for additional bleeding, serial abdominal exams to monitor for any leaking/perforations.

## 2019-10-12 NOTE — BRIEF OPERATIVE NOTE - OPERATION/FINDINGS
Flexible sigmoidoscopy: able to advance scope to 80cm, old blood in rectal vault and near staple line at 25cm but no active bleeding. No blood proximal to anastomosis. Staple line appeared intact, mildly edematous, with no active bleeding. Nasogastric tube placed and lavaged. Normal gastric content with bile, nonbloody, no coffee ground.    Diagnostic laparoscopy: no pneumoperitoneum encountered upon cut down into the abdomen. Minimal hemorrhagic ascites consistent with expected post op timeline. Anastomosis visualized and no hematoma encountered. No hematoma in the pelvis. Hemostasis was achieved. Fascia closed with interrupted vicryl.

## 2019-10-12 NOTE — PROGRESS NOTE ADULT - SUBJECTIVE AND OBJECTIVE BOX
Interval history:  Pt very tired this morning. Reports abd pain and some distention.     HPI: 75 yo F with hx of HTN, HLD and hemorrhoids who developed BRBPR over the summer which she thought was from hemorrhoids, however it persisted.  She had her first colonoscopy ever in September which showed a sigmoid/rectal mass w/bx showing adenocarcinoma.  6 polyps were also removed during the procedure. Metastatic workup was negative as per notes.  10/10 she was admitted for LAR.  She reports that since yesterday she has thought she was going to have a BM 7 times but has just passed blood and clots.   She otherwise feels fine - no SOB, dizziness, lightheadedness.  Some abdominal pain at surgical site that she says is tolerable.    ROS: 12 pt ROS performed, neg except as per HPI.    PMH:  Basal cell carcinoma of forehead   otitis bilateral  Hypertension  Malignant neoplasm of descending colon   Other hyperlipidemia.     PSH:   History of right cataract extraction   History of surgery basal cell removal from forehead  History of surgery Cardiac catherization - negative    SH:  Former smoker-- quit 30 years ago  Retired .    Allergies: none    Home Medications:   * Patient Currently Takes Medications as of 10-Oct-2019 11:26 documented in Structured Notes  · 	enalapril 10 mg oral tablet: Last Dose Taken: 10-Oct-2019 AM, 1 tab(s) orally once a day  · 	atenolol 50 mg oral tablet: Last Dose Taken: 10-Oct-2019 AM, 1 tab(s) orally once a day  · 	simvastatin 40 mg oral tablet: Last Dose Taken: 09-Oct-2019 PM, 1 tab(s) orally once a day (at bedtime)  · 	Aspirin Enteric Coated 81 mg oral delayed release tablet: Last Dose Taken: 04-Oct-2019 , 1 tab(s) orally once a day    Current meds  MEDICATIONS  (STANDING):  acetaminophen   Tablet .. 650 milliGRAM(s) Oral every 6 hours  alvimopan 12 milliGRAM(s) Oral two times a day  ATENolol  Tablet 50 milliGRAM(s) Oral daily  BUpivacaine liposome 1.3% Injectable (no eMAR) 20 milliLiter(s) Local Injection once  enalapril 10 milliGRAM(s) Oral daily  gabapentin 300 milliGRAM(s) Oral every 6 hours  HYDROmorphone PCA (1 mG/mL) 30 milliLiter(s) PCA Continuous PCA Continuous  lactated ringers. 1000 milliLiter(s) (125 mL/Hr) IV Continuous <Continuous>    MEDICATIONS  (PRN):  ondansetron Injectable 4 milliGRAM(s) IV Push every 6 hours PRN Nausea    Exam:  Vital Signs Last 24 Hrs  T(C): 36.5 (12 Oct 2019 09:14), Max: 37.7 (11 Oct 2019 17:33)  T(F): 97.7 (12 Oct 2019 09:14), Max: 99.9 (11 Oct 2019 17:33)  HR: 104 (12 Oct 2019 06:54) (84 - 120)  BP: 159/70 (12 Oct 2019 06:54) (129/61 - 221/82)  BP(mean): 101 (12 Oct 2019 06:54) (88 - 124)  RR: 18 (12 Oct 2019 06:54) (17 - 53)  SpO2: 96% (12 Oct 2019 06:54) (94% - 100%)    Gen: well appearing, NAD while lying down, pale  HEENT: MMM  Card: rrr, s1/s2, no mrg  Lungs: ctab  Abd: +BS, soft, diffusely with mild tenderness but no rebound or guarding  LE: no edema  Neuro: aaox3, moving all extremities    Labs:    Hgb 9.1 at 3 AM today.   WBC 10.6.      Path:  Descending colon polyp, polypectomy ([id number]):  - Fragments of hyperplastic polyp.    Descending colon polyp, polypectomy ([id number]):  - Tubular adenoma.    Descending colon polyp, polypectomy ([id number]):  - Tubulovillous adenoma.    Descending colon polyp, polypectomy ([id number]):  - Tubular adenoma.    Descending colon polyp, polypectomy ([id number]):  - Tubular adenoma.    Descending colon polyp, polypectomy ([id number]):  - Tubulovillous adenoma.    Descending colon mass, biopsy ([id number]):  - Superficial fragments of colonic mucosa with the least  intramucosal adenocarcinoma.

## 2019-10-12 NOTE — BRIEF OPERATIVE NOTE - NSICDXBRIEFPREOP_GEN_ALL_CORE_FT
PRE-OP DIAGNOSIS:  Acute blood loss anemia 12-Oct-2019 02:28:26  Chaz Beaver
PRE-OP DIAGNOSIS:  Colorectal cancer 10-Oct-2019 18:25:30  Cassandra Leach

## 2019-10-13 LAB
ANION GAP SERPL CALC-SCNC: 11 MMOL/L — SIGNIFICANT CHANGE UP (ref 5–17)
ANION GAP SERPL CALC-SCNC: 12 MMOL/L — SIGNIFICANT CHANGE UP (ref 5–17)
ANION GAP SERPL CALC-SCNC: 12 MMOL/L — SIGNIFICANT CHANGE UP (ref 5–17)
BUN SERPL-MCNC: 8 MG/DL — SIGNIFICANT CHANGE UP (ref 7–23)
CALCIUM SERPL-MCNC: 7.4 MG/DL — LOW (ref 8.4–10.5)
CALCIUM SERPL-MCNC: 7.5 MG/DL — LOW (ref 8.4–10.5)
CALCIUM SERPL-MCNC: 7.7 MG/DL — LOW (ref 8.4–10.5)
CHLORIDE SERPL-SCNC: 101 MMOL/L — SIGNIFICANT CHANGE UP (ref 96–108)
CHLORIDE SERPL-SCNC: 101 MMOL/L — SIGNIFICANT CHANGE UP (ref 96–108)
CHLORIDE SERPL-SCNC: 98 MMOL/L — SIGNIFICANT CHANGE UP (ref 96–108)
CO2 SERPL-SCNC: 23 MMOL/L — SIGNIFICANT CHANGE UP (ref 22–31)
CO2 SERPL-SCNC: 24 MMOL/L — SIGNIFICANT CHANGE UP (ref 22–31)
CO2 SERPL-SCNC: 25 MMOL/L — SIGNIFICANT CHANGE UP (ref 22–31)
CREAT SERPL-MCNC: 0.42 MG/DL — LOW (ref 0.5–1.3)
CREAT SERPL-MCNC: 0.42 MG/DL — LOW (ref 0.5–1.3)
CREAT SERPL-MCNC: 0.43 MG/DL — LOW (ref 0.5–1.3)
GLUCOSE SERPL-MCNC: 104 MG/DL — HIGH (ref 70–99)
GLUCOSE SERPL-MCNC: 108 MG/DL — HIGH (ref 70–99)
GLUCOSE SERPL-MCNC: 84 MG/DL — SIGNIFICANT CHANGE UP (ref 70–99)
HCT VFR BLD CALC: 24.1 % — LOW (ref 34.5–45)
HCT VFR BLD CALC: 24.4 % — LOW (ref 34.5–45)
HCT VFR BLD CALC: 25.4 % — LOW (ref 34.5–45)
HGB BLD-MCNC: 8 G/DL — LOW (ref 11.5–15.5)
HGB BLD-MCNC: 8.2 G/DL — LOW (ref 11.5–15.5)
HGB BLD-MCNC: 8.5 G/DL — LOW (ref 11.5–15.5)
MAGNESIUM SERPL-MCNC: 1.8 MG/DL — SIGNIFICANT CHANGE UP (ref 1.6–2.6)
MCHC RBC-ENTMCNC: 29.9 PG — SIGNIFICANT CHANGE UP (ref 27–34)
MCHC RBC-ENTMCNC: 30 PG — SIGNIFICANT CHANGE UP (ref 27–34)
MCHC RBC-ENTMCNC: 30.2 PG — SIGNIFICANT CHANGE UP (ref 27–34)
MCHC RBC-ENTMCNC: 33.2 GM/DL — SIGNIFICANT CHANGE UP (ref 32–36)
MCHC RBC-ENTMCNC: 33.5 GM/DL — SIGNIFICANT CHANGE UP (ref 32–36)
MCHC RBC-ENTMCNC: 33.6 GM/DL — SIGNIFICANT CHANGE UP (ref 32–36)
MCV RBC AUTO: 89.1 FL — SIGNIFICANT CHANGE UP (ref 80–100)
MCV RBC AUTO: 90.3 FL — SIGNIFICANT CHANGE UP (ref 80–100)
MCV RBC AUTO: 90.4 FL — SIGNIFICANT CHANGE UP (ref 80–100)
NRBC # BLD: 0 /100 WBCS — SIGNIFICANT CHANGE UP (ref 0–0)
PHOSPHATE SERPL-MCNC: 2 MG/DL — LOW (ref 2.5–4.5)
PLATELET # BLD AUTO: 135 K/UL — LOW (ref 150–400)
PLATELET # BLD AUTO: 145 K/UL — LOW (ref 150–400)
PLATELET # BLD AUTO: 160 K/UL — SIGNIFICANT CHANGE UP (ref 150–400)
POTASSIUM SERPL-MCNC: 3.1 MMOL/L — LOW (ref 3.5–5.3)
POTASSIUM SERPL-MCNC: 3.4 MMOL/L — LOW (ref 3.5–5.3)
POTASSIUM SERPL-MCNC: 3.4 MMOL/L — LOW (ref 3.5–5.3)
POTASSIUM SERPL-SCNC: 3.1 MMOL/L — LOW (ref 3.5–5.3)
POTASSIUM SERPL-SCNC: 3.4 MMOL/L — LOW (ref 3.5–5.3)
POTASSIUM SERPL-SCNC: 3.4 MMOL/L — LOW (ref 3.5–5.3)
RBC # BLD: 2.67 M/UL — LOW (ref 3.8–5.2)
RBC # BLD: 2.74 M/UL — LOW (ref 3.8–5.2)
RBC # BLD: 2.81 M/UL — LOW (ref 3.8–5.2)
RBC # FLD: 14.9 % — HIGH (ref 10.3–14.5)
RBC # FLD: 15 % — HIGH (ref 10.3–14.5)
RBC # FLD: 15.1 % — HIGH (ref 10.3–14.5)
SODIUM SERPL-SCNC: 133 MMOL/L — LOW (ref 135–145)
SODIUM SERPL-SCNC: 136 MMOL/L — SIGNIFICANT CHANGE UP (ref 135–145)
SODIUM SERPL-SCNC: 138 MMOL/L — SIGNIFICANT CHANGE UP (ref 135–145)
WBC # BLD: 10.53 K/UL — HIGH (ref 3.8–10.5)
WBC # BLD: 9.6 K/UL — SIGNIFICANT CHANGE UP (ref 3.8–10.5)
WBC # BLD: 9.71 K/UL — SIGNIFICANT CHANGE UP (ref 3.8–10.5)
WBC # FLD AUTO: 10.53 K/UL — HIGH (ref 3.8–10.5)
WBC # FLD AUTO: 9.6 K/UL — SIGNIFICANT CHANGE UP (ref 3.8–10.5)
WBC # FLD AUTO: 9.71 K/UL — SIGNIFICANT CHANGE UP (ref 3.8–10.5)

## 2019-10-13 RX ORDER — LABETALOL HCL 100 MG
10 TABLET ORAL ONCE
Refills: 0 | Status: DISCONTINUED | OUTPATIENT
Start: 2019-10-13 | End: 2019-10-13

## 2019-10-13 RX ORDER — POTASSIUM CHLORIDE 20 MEQ
20 PACKET (EA) ORAL ONCE
Refills: 0 | Status: COMPLETED | OUTPATIENT
Start: 2019-10-13 | End: 2019-10-13

## 2019-10-13 RX ORDER — HYDRALAZINE HCL 50 MG
10 TABLET ORAL ONCE
Refills: 0 | Status: COMPLETED | OUTPATIENT
Start: 2019-10-13 | End: 2019-10-13

## 2019-10-13 RX ORDER — POTASSIUM CHLORIDE 20 MEQ
10 PACKET (EA) ORAL ONCE
Refills: 0 | Status: COMPLETED | OUTPATIENT
Start: 2019-10-13 | End: 2019-10-13

## 2019-10-13 RX ORDER — METOPROLOL TARTRATE 50 MG
5 TABLET ORAL ONCE
Refills: 0 | Status: COMPLETED | OUTPATIENT
Start: 2019-10-13 | End: 2019-10-13

## 2019-10-13 RX ORDER — POTASSIUM PHOSPHATE, MONOBASIC POTASSIUM PHOSPHATE, DIBASIC 236; 224 MG/ML; MG/ML
15 INJECTION, SOLUTION INTRAVENOUS ONCE
Refills: 0 | Status: COMPLETED | OUTPATIENT
Start: 2019-10-13 | End: 2019-10-13

## 2019-10-13 RX ORDER — LABETALOL HCL 100 MG
5 TABLET ORAL ONCE
Refills: 0 | Status: COMPLETED | OUTPATIENT
Start: 2019-10-13 | End: 2019-10-13

## 2019-10-13 RX ORDER — POTASSIUM CHLORIDE 20 MEQ
40 PACKET (EA) ORAL ONCE
Refills: 0 | Status: COMPLETED | OUTPATIENT
Start: 2019-10-13 | End: 2019-10-13

## 2019-10-13 RX ADMIN — Medication 10 MILLIGRAM(S): at 06:10

## 2019-10-13 RX ADMIN — Medication 5 MILLIGRAM(S): at 03:35

## 2019-10-13 RX ADMIN — Medication 100 MILLIEQUIVALENT(S): at 10:24

## 2019-10-13 RX ADMIN — Medication 40 MILLIEQUIVALENT(S): at 08:40

## 2019-10-13 RX ADMIN — Medication 5 MILLIGRAM(S): at 04:00

## 2019-10-13 RX ADMIN — ALVIMOPAN 12 MILLIGRAM(S): 12 CAPSULE ORAL at 21:18

## 2019-10-13 RX ADMIN — Medication 20 MILLIEQUIVALENT(S): at 21:18

## 2019-10-13 RX ADMIN — POTASSIUM PHOSPHATE, MONOBASIC POTASSIUM PHOSPHATE, DIBASIC 63.75 MILLIMOLE(S): 236; 224 INJECTION, SOLUTION INTRAVENOUS at 11:45

## 2019-10-13 RX ADMIN — ALVIMOPAN 12 MILLIGRAM(S): 12 CAPSULE ORAL at 10:24

## 2019-10-13 RX ADMIN — Medication 5 MILLIGRAM(S): at 21:48

## 2019-10-13 RX ADMIN — Medication 10 MILLIGRAM(S): at 19:55

## 2019-10-13 RX ADMIN — SODIUM CHLORIDE 50 MILLILITER(S): 9 INJECTION, SOLUTION INTRAVENOUS at 04:51

## 2019-10-13 RX ADMIN — ATENOLOL 50 MILLIGRAM(S): 25 TABLET ORAL at 04:27

## 2019-10-13 NOTE — PROGRESS NOTE ADULT - SUBJECTIVE AND OBJECTIVE BOX
24 hr events:  ON: 12am Hb 8.3. Uo 100cc per hour. BP 180s, minimal response to 10 Lobetalol (170s). Episode of AFib, responded to 5 Metoprolol x2. Cards consulted, continue w/ home meds, will follow. Bloody BM (530am) Dark, 200cc.   10/12: 12pm Hb 8.9, 6pm 8.8. No flank echymoses.  in AM likely due to anxiety, returned to below 100 for rest of day. Systolic at 200 responded to Hydralazine (150s), back up to 180s responded to 10 labatalol (150s). Uo .83 cc/kg*hr. Ordered Echo. CXR showing improved wesley-hilar infiltrates    SUBJECTIVE:  Pt seen and examined by chief resident. Pt is doing well, resting comfortably on bed. Pain controlled. Diet tolerated. Ambulating out of bed. +F/+BM. No nausea or vomiting. No complaints at this time.      Vital Signs Last 24 Hrs  T(C): 36.1 (13 Oct 2019 09:08), Max: 37 (12 Oct 2019 18:01)  T(F): 97 (13 Oct 2019 09:08), Max: 98.6 (12 Oct 2019 18:01)  HR: 94 (13 Oct 2019 08:26) (82 - 106)  BP: 145/68 (13 Oct 2019 08:26) (134/67 - 215/83)  BP(mean): 97 (13 Oct 2019 08:26) (92 - 123)  RR: 16 (13 Oct 2019 08:26) (16 - 20)  SpO2: 96% (13 Oct 2019 08:26) (95% - 98%)    Physical Exam:  General: NAD  Pulmonary: Nonlabored breathing, no respiratory distress  Cardiovascular: NSR  Abdominal: soft, NT/ND, no organomegaly  Extremities: WWP, normal strength, no clubbing/cyanosis/edema  Neuro: A/O x3, no focal deficits, normal sensation  Pulses: palpable distal pulses    Lines/drains/tubes:    I&O's Summary    12 Oct 2019 07:01  -  13 Oct 2019 07:00  --------------------------------------------------------  IN: 1350 mL / OUT: 1700 mL / NET: -350 mL        LABS:                        8.0    9.71  )-----------( 145      ( 13 Oct 2019 06:46 )             24.1     10-13    138  |  101  |  8   ----------------------------<  108<H>  3.1<L>   |  25  |  0.43<L>    Ca    7.5<L>      13 Oct 2019 06:46  Phos  2.0     10-13  Mg     1.8     10-13          CAPILLARY BLOOD GLUCOSE            RADIOLOGY & ADDITIONAL STUDIES: 24 hr events:  ON: 12am Hb 8.3. Uo 100cc per hour. BP 180s, minimal response to 10 Lobetalol (170s). Episode of AFib, responded to 5 Metoprolol x2. Cards consulted, continue w/ home meds, will follow. Bloody BM (530am) Dark, 200cc.   10/12: 12pm Hb 8.9, 6pm 8.8. No flank echymoses.  in AM likely due to anxiety, returned to below 100 for rest of day. Systolic at 200 responded to Hydralazine (150s), back up to 180s responded to 10 labatalol (150s). Uo .83 cc/kg*hr. Ordered Echo. CXR showing improved wesley-hilar infiltrates    SUBJECTIVE:  Pt seen and examined by chief resident. Pt is doing well, resting comfortably on bed. Pain controlled.  +F/+bloody BM. No nausea or vomiting. No complaints at this time.      Vital Signs Last 24 Hrs  T(C): 36.1 (13 Oct 2019 09:08), Max: 37 (12 Oct 2019 18:01)  T(F): 97 (13 Oct 2019 09:08), Max: 98.6 (12 Oct 2019 18:01)  HR: 94 (13 Oct 2019 08:26) (82 - 106)  BP: 145/68 (13 Oct 2019 08:26) (134/67 - 215/83)  BP(mean): 97 (13 Oct 2019 08:26) (92 - 123)  RR: 16 (13 Oct 2019 08:26) (16 - 20)  SpO2: 96% (13 Oct 2019 08:26) (95% - 98%)    Physical Exam:  General: NAD  Pulmonary: Nonlabored breathing, no respiratory distress  Cardiovascular: NSR  Abdominal: soft, NT/ND, incisions c/d/i, midline incisions with staples  Extremities: WWP, normal strength,   Neuro: A/O x3,   Pulses: palpable distal pulses      I&O's Summary    12 Oct 2019 07:01  -  13 Oct 2019 07:00  --------------------------------------------------------  IN: 1350 mL / OUT: 1700 mL / NET: -350 mL        LABS:                        8.0    9.71  )-----------( 145      ( 13 Oct 2019 06:46 )             24.1     10-13    138  |  101  |  8   ----------------------------<  108<H>  3.1<L>   |  25  |  0.43<L>    Ca    7.5<L>      13 Oct 2019 06:46  Phos  2.0     10-13  Mg     1.8     10-13                      RADIOLOGY & ADDITIONAL STUDIES: 24 hr events:  ON: 12am Hb 8.3. Uo 100cc per hour. BP 180s, minimal response to 10 Lobetalol (170s). Episode of AFib, responded to 5 Metoprolol x2. Cards consulted, continue w/ home meds, will follow. Bloody BM (530am) Dark, 200cc.   10/12: 12pm Hb 8.9, 6pm 8.8. No flank echymoses.  in AM likely due to anxiety, returned to below 100 for rest of day. Systolic at 200 responded to Hydralazine (150s), back up to 180s responded to 10 labatalol (150s). Uo .83 cc/kg*hr. Ordered Echo. CXR showing improved wesley-hilar infiltrates    SUBJECTIVE:  Pt seen and examined by chief resident. Pt is doing well, resting comfortably on bed. Pain controlled.  +F/+bloody BM. No nausea or vomiting. No complaints at this time.      Vital Signs Last 24 Hrs  T(C): 36.1 (13 Oct 2019 09:08), Max: 37 (12 Oct 2019 18:01)  T(F): 97 (13 Oct 2019 09:08), Max: 98.6 (12 Oct 2019 18:01)  HR: 94 (13 Oct 2019 08:26) (82 - 106)  BP: 145/68 (13 Oct 2019 08:26) (134/67 - 215/83)  BP(mean): 97 (13 Oct 2019 08:26) (92 - 123)  RR: 16 (13 Oct 2019 08:26) (16 - 20)  SpO2: 96% (13 Oct 2019 08:26) (95% - 98%)    Physical Exam:  General: NAD  Pulmonary: Nonlabored breathing, no respiratory distress  Cardiovascular: NSR  Abdominal: soft, NT/ND, incisions c/d/i, midline incisions with staples  Extremities: WWP, normal strength,   Neuro: A/O x3,       I&O's Summary    12 Oct 2019 07:01  -  13 Oct 2019 07:00  --------------------------------------------------------  IN: 1350 mL / OUT: 1700 mL / NET: -350 mL        LABS:                        8.0    9.71  )-----------( 145      ( 13 Oct 2019 06:46 )             24.1     10-13    138  |  101  |  8   ----------------------------<  108<H>  3.1<L>   |  25  |  0.43<L>    Ca    7.5<L>      13 Oct 2019 06:46  Phos  2.0     10-13  Mg     1.8     10-13

## 2019-10-13 NOTE — CONSULT NOTE ADULT - ASSESSMENT
#Atrial fibrillation - review of patient's EKG and telemetry strips reveals atrial fibrillation, presumably new onset.  - restart home beta blocker for optimal rate control  - [id number] score of 3. Recommend anticoagulation initiation w/eliquis 5mg bid when deemed appropriate by surgical service  - official echocardiogram    --  Jai Valente MD  Cardiology PGY4 74F w/HTN, HLD adm for resection of sigmoid mass complicated by LGIB. Cardiology consulted for tachycardia evaluation    #Atrial fibrillation - review of patient's EKG and telemetry strips reveals atrial fibrillation, presumably new onset.  - restart home beta blocker for optimal rate control  - [id number] score of 3. Recommend anticoagulation initiation w/eliquis 5mg bid when deemed appropriate by surgical service  - official echocardiogram    --  Jai Valente MD  Cardiology PGY4

## 2019-10-13 NOTE — PROGRESS NOTE ADULT - ASSESSMENT
75 yo F with hx of HTN, HLD hemorrhoids, and newly dx'd sigmoid adeno s/p LAR with worsening anemia.  Patient is passing blood/clots.  -labs w/o iron deficiency or elevated ferritin to suggest anemia of chronic inflammation.  -throughout night of 10/11 into 10/12 had sigmoidoscopy- no bleed. Pt then taken for exploratory laparoscopy- no bleed found.  -hapto 130 and ldh 240- not consistent with hemolysis.  -hgb continues to trend down. In 24 hours decreased from 9.1 to 8.0.  -monitor CBC q6 hrs and transfuse to goal Hb of 7.   -discussed case with surgery on 10/13/19- at this time no plan for further surgical exploration.  -so far has received 3 units of PRBC on 10/11/19, 1 unit of PRBC on 10/12/19, and 1 unit of plt on 10/12/19.  -if further transfusions are necessary monitor for dilutional coagulopathy (decline in plts and abnormal coags).  -awaiting final path from LAR.  -tachycardic throughout 10/12/19. Cardiology consulted- pt diagnosed with Afib. Now on beta-blocker. Holding off on starting anticoagulation give hgb instability.       Thank you for allowing me to participate in her care.  Please feel free to call w/any questions.    Gary Hansen MD covering for Nicole Ordonez MD  [facility] Attending  [phone]

## 2019-10-13 NOTE — PROGRESS NOTE ADULT - SUBJECTIVE AND OBJECTIVE BOX
Interval history:  Continues to have some abd discomfort. No other new complaints.   Tachycardic yesterday- seen by cardiology and diagnosed with new onset Afib.     HPI: 73 yo F with hx of HTN, HLD and hemorrhoids who developed BRBPR over the summer which she thought was from hemorrhoids, however it persisted.  She had her first colonoscopy ever in September which showed a sigmoid/rectal mass w/bx showing adenocarcinoma.  6 polyps were also removed during the procedure. Metastatic workup was negative as per notes.  10/10 she was admitted for LAR.  She reports that since yesterday she has thought she was going to have a BM 7 times but has just passed blood and clots.   She otherwise feels fine - no SOB, dizziness, lightheadedness.  Some abdominal pain at surgical site that she says is tolerable.    ROS: 12 pt ROS performed, neg except as per HPI.    PMH:  Basal cell carcinoma of forehead   otitis bilateral  Hypertension  Malignant neoplasm of descending colon   Other hyperlipidemia.     PSH:   History of right cataract extraction   History of surgery basal cell removal from forehead  History of surgery Cardiac catherization - negative    SH:  Former smoker-- quit 30 years ago  Retired .    Allergies: none    Home Medications:   * Patient Currently Takes Medications as of 10-Oct-2019 11:26 documented in Structured Notes  · 	enalapril 10 mg oral tablet: Last Dose Taken: 10-Oct-2019 AM, 1 tab(s) orally once a day  · 	atenolol 50 mg oral tablet: Last Dose Taken: 10-Oct-2019 AM, 1 tab(s) orally once a day  · 	simvastatin 40 mg oral tablet: Last Dose Taken: 09-Oct-2019 PM, 1 tab(s) orally once a day (at bedtime)  · 	Aspirin Enteric Coated 81 mg oral delayed release tablet: Last Dose Taken: 04-Oct-2019 , 1 tab(s) orally once a day    Current meds  MEDICATIONS  (STANDING):  acetaminophen   Tablet .. 650 milliGRAM(s) Oral every 6 hours  alvimopan 12 milliGRAM(s) Oral two times a day  ATENolol  Tablet 50 milliGRAM(s) Oral daily  BUpivacaine liposome 1.3% Injectable (no eMAR) 20 milliLiter(s) Local Injection once  enalapril 10 milliGRAM(s) Oral daily  gabapentin 300 milliGRAM(s) Oral every 6 hours  HYDROmorphone PCA (1 mG/mL) 30 milliLiter(s) PCA Continuous PCA Continuous  lactated ringers. 1000 milliLiter(s) (125 mL/Hr) IV Continuous <Continuous>    MEDICATIONS  (PRN):  ondansetron Injectable 4 milliGRAM(s) IV Push every 6 hours PRN Nausea    Exam:  Vital Signs Last 24 Hrs  T(C): 36.1 (13 Oct 2019 09:08), Max: 37 (12 Oct 2019 18:01)  T(F): 97 (13 Oct 2019 09:08), Max: 98.6 (12 Oct 2019 18:01)  HR: 94 (13 Oct 2019 08:26) (82 - 106)  BP: 145/68 (13 Oct 2019 08:26) (134/67 - 215/83)  BP(mean): 97 (13 Oct 2019 08:26) (92 - 123)  RR: 16 (13 Oct 2019 08:26) (16 - 20)  SpO2: 96% (13 Oct 2019 08:26) (95% - 98%)    Gen: well appearing, NAD while lying down, pale  HEENT: MMM  Card: rrr, s1/s2, no mrg  Lungs: ctab  Abd: +BS, soft, diffusely with mild tenderness but no rebound or guarding  LE: no edema  Neuro: aaox3, moving all extremities    Labs:    Hgb 8.0   WBC 9.7  plt 145  Hapto 138  ldh 240      Path:  Descending colon polyp, polypectomy ([id number]):  - Fragments of hyperplastic polyp.    Descending colon polyp, polypectomy ([id number]):  - Tubular adenoma.    Descending colon polyp, polypectomy ([id number]):  - Tubulovillous adenoma.    Descending colon polyp, polypectomy ([id number]):  - Tubular adenoma.    Descending colon polyp, polypectomy ([id number]):  - Tubular adenoma.    Descending colon polyp, polypectomy ([id number]):  - Tubulovillous adenoma.    Descending colon mass, biopsy ([id number]):  - Superficial fragments of colonic mucosa with the least  intramucosal adenocarcinoma.

## 2019-10-13 NOTE — CONSULT NOTE ADULT - SUBJECTIVE AND OBJECTIVE BOX
HPI:  This is a 73 yo F with hx of HTN, HLD and hemorrhoids who had some BPR (which she attributed to hemorrhoids) this summer which prompted her to receive her first colonoscopy ever in September which showed a sigmoid/rectal mass. Metastatic workup was negative. She tolerated prep for today.     She worked as a  for 35 years (recently retired) and can walk more than 10 blocks. She has hip and knee pain which prevents her from climbing stairs but she does not have any chest pain or shortness of breath with exertion. In 2006 she underwent negative cardiac cath and has never had a cardiac event. (10 Oct 2019 12:48)      ROS: A 10-point review of systems was otherwise negative.    PAST MEDICAL & SURGICAL HISTORY:  Malignant neoplasm of descending colon  History of otitis: bilateral  Basal cell carcinoma of forehead  Hypertension, unspecified type  Other hyperlipidemia  History of surgery: Cardiac catherization  History of surgery: basal cell removal from forehead  History of right cataract extraction      SOCIAL HISTORY:  FAMILY HISTORY:      ALLERGIES: 	  No Known Allergies            MEDICATIONS:  acetaminophen   Tablet .. 650 milliGRAM(s) Oral every 6 hours  alvimopan 12 milliGRAM(s) Oral two times a day  ATENolol  Tablet 50 milliGRAM(s) Oral daily  BUpivacaine liposome 1.3% Injectable (no eMAR) 20 milliLiter(s) Local Injection once  enalapril 10 milliGRAM(s) Oral daily  gabapentin 300 milliGRAM(s) Oral every 6 hours  HYDROmorphone PCA (1 mG/mL) 30 milliLiter(s) PCA Continuous PCA Continuous  lactated ringers. 1000 milliLiter(s) IV Continuous <Continuous>  ondansetron Injectable 4 milliGRAM(s) IV Push every 6 hours PRN      PHYSICAL EXAM:  T(C): 36.9 (10-13-19 @ 03:56), Max: 37 (10-12-19 @ 18:01)  HR: 106 (10-13-19 @ 03:20) (82 - 110)  BP: 160/66 (10-13-19 @ 03:20) (156/76 - 215/83)  RR: 16 (10-13-19 @ 03:20) (16 - 22)  SpO2: 95% (10-13-19 @ 03:20) (93% - 98%)  Wt(kg): --    10-11-19 @ 07:01  -  10-12-19 @ 07:00  --------------------------------------------------------  IN: 2300 mL / OUT: 525 mL / NET: 1775 mL    10-12-19 @ 07:01  -  10-13-19 @ 05:44  --------------------------------------------------------  IN: 700 mL / OUT: 1550 mL / NET: -850 mL        GEN: Awake, comfortable. NAD.   HEENT: NCAT, PERRL, EOMI. Mucosa moist. No JVD.   RESP: CTA b/l  CV: RRR, normal s1/s2. No m/r/g.  ABD: Soft, NTND. BS+  EXT: Warm. No edema, clubbing, or cyanosis.   NEURO: AAOx3. No focal deficits.    I&O's Summary    11 Oct 2019 07:01  -  12 Oct 2019 07:00  --------------------------------------------------------  IN: 2300 mL / OUT: 525 mL / NET: 1775 mL    12 Oct 2019 07:01  -  13 Oct 2019 05:44  --------------------------------------------------------  IN: 700 mL / OUT: 1550 mL / NET: -850 mL        	  LABS:	 	    CARDIAC MARKERS:                                  8.2    9.60  )-----------( 135      ( 13 Oct 2019 00:18 )             24.4     10-13    136  |  101  |  8   ----------------------------<  104<H>  3.4<L>   |  23  |  0.42<L>    Ca    7.4<L>      13 Oct 2019 04:16  Phos  3.2     10-12  Mg     1.2     10-12      proBNP:   Lipid Profile:   HgA1c:   TSH:     TELEMETRY: 	    ECG:  	  RADIOLOGY:   ECHO:  STRESS:  CATH: HPI:  This is a 75 yo F with hx of HTN, HLD and hemorrhoids who had some BPR (which she attributed to hemorrhoids) this summer which prompted her to receive her first colonoscopy ever in September which showed a sigmoid/rectal mass. Metastatic workup was negative. She tolerated prep for today.     She worked as a  for 35 years (recently retired) and can walk more than 10 blocks. She has hip and knee pain which prevents her from climbing stairs but she does not have any chest pain or shortness of breath with exertion. In 2006 she underwent negative cardiac cath and has never had a cardiac event. (10 Oct 2019 12:48)    Cardiology consulted for assistance in EKG interpretation during tachycardia episode. Patient without active chest pain, palpitations, SOB when examined at bedside    ROS: A 10-point review of systems was otherwise negative.    PAST MEDICAL & SURGICAL HISTORY:  Malignant neoplasm of descending colon  History of otitis: bilateral  Basal cell carcinoma of forehead  Hypertension, unspecified type  Other hyperlipidemia  History of surgery: Cardiac catherization  History of surgery: basal cell removal from forehead  History of right cataract extraction      SOCIAL HISTORY: Smoked 23 years, 2PPd, quit 35 years ago. Drinks a 6 pack of beer a day; last drink 10/9. No drug use  FAMILY HISTORY: FHx of rctal cancer, lung cancer, MI in her brother      ALLERGIES: 	  No Known Allergies            MEDICATIONS:  acetaminophen   Tablet .. 650 milliGRAM(s) Oral every 6 hours  alvimopan 12 milliGRAM(s) Oral two times a day  ATENolol  Tablet 50 milliGRAM(s) Oral daily  BUpivacaine liposome 1.3% Injectable (no eMAR) 20 milliLiter(s) Local Injection once  enalapril 10 milliGRAM(s) Oral daily  gabapentin 300 milliGRAM(s) Oral every 6 hours  HYDROmorphone PCA (1 mG/mL) 30 milliLiter(s) PCA Continuous PCA Continuous  lactated ringers. 1000 milliLiter(s) IV Continuous <Continuous>  ondansetron Injectable 4 milliGRAM(s) IV Push every 6 hours PRN      PHYSICAL EXAM:  T(C): 36.9 (10-13-19 @ 03:56), Max: 37 (10-12-19 @ 18:01)  HR: 106 (10-13-19 @ 03:20) (82 - 110)  BP: 160/66 (10-13-19 @ 03:20) (156/76 - 215/83)  RR: 16 (10-13-19 @ 03:20) (16 - 22)  SpO2: 95% (10-13-19 @ 03:20) (93% - 98%)  Wt(kg): --    10-11-19 @ 07:01  -  10-12-19 @ 07:00  --------------------------------------------------------  IN: 2300 mL / OUT: 525 mL / NET: 1775 mL    10-12-19 @ 07:01  -  10-13-19 @ 05:44  --------------------------------------------------------  IN: 700 mL / OUT: 1550 mL / NET: -850 mL        GEN: Awake, comfortable. NAD.   HEENT: NCAT, PERRL, EOMI. Mucosa moist. No JVD.   RESP: CTA b/l  CV: irregular, normal s1/s2. No m/r/g.  ABD: Soft, NTND. BS+. Dressings in place.   EXT: Warm. No edema, clubbing, or cyanosis.   NEURO: AAOx3. No focal deficits.    I&O's Summary    11 Oct 2019 07:01  -  12 Oct 2019 07:00  --------------------------------------------------------  IN: 2300 mL / OUT: 525 mL / NET: 1775 mL    12 Oct 2019 07:01  -  13 Oct 2019 05:44  --------------------------------------------------------  IN: 700 mL / OUT: 1550 mL / NET: -850 mL        	  LABS:	 	    CARDIAC MARKERS:                                  8.2    9.60  )-----------( 135      ( 13 Oct 2019 00:18 )             24.4     10-13    136  |  101  |  8   ----------------------------<  104<H>  3.4<L>   |  23  |  0.42<L>    Ca    7.4<L>      13 Oct 2019 04:16  Phos  3.2     10-12  Mg     1.2     10-12        TELEMETRY: 	    ECG: Afib

## 2019-10-13 NOTE — PROGRESS NOTE ADULT - ASSESSMENT
73 yo F hx HTN, HLD with sigmoid mass here for resection. Postop course c/b BRBPR    -q6hr CBC  -ERAS  -Colorectal bundle  -am labs

## 2019-10-14 LAB
ANION GAP SERPL CALC-SCNC: 12 MMOL/L — SIGNIFICANT CHANGE UP (ref 5–17)
ANION GAP SERPL CALC-SCNC: 9 MMOL/L — SIGNIFICANT CHANGE UP (ref 5–17)
APTT BLD: 25.5 SEC — LOW (ref 27.5–36.3)
BUN SERPL-MCNC: 6 MG/DL — LOW (ref 7–23)
BUN SERPL-MCNC: 7 MG/DL — SIGNIFICANT CHANGE UP (ref 7–23)
CALCIUM SERPL-MCNC: 7.5 MG/DL — LOW (ref 8.4–10.5)
CALCIUM SERPL-MCNC: 8.2 MG/DL — LOW (ref 8.4–10.5)
CHLORIDE SERPL-SCNC: 102 MMOL/L — SIGNIFICANT CHANGE UP (ref 96–108)
CHLORIDE SERPL-SCNC: 102 MMOL/L — SIGNIFICANT CHANGE UP (ref 96–108)
CO2 SERPL-SCNC: 22 MMOL/L — SIGNIFICANT CHANGE UP (ref 22–31)
CO2 SERPL-SCNC: 26 MMOL/L — SIGNIFICANT CHANGE UP (ref 22–31)
CREAT SERPL-MCNC: 0.38 MG/DL — LOW (ref 0.5–1.3)
CREAT SERPL-MCNC: 0.42 MG/DL — LOW (ref 0.5–1.3)
GLUCOSE SERPL-MCNC: 89 MG/DL — SIGNIFICANT CHANGE UP (ref 70–99)
GLUCOSE SERPL-MCNC: 93 MG/DL — SIGNIFICANT CHANGE UP (ref 70–99)
HCT VFR BLD CALC: 26.3 % — LOW (ref 34.5–45)
HGB BLD-MCNC: 8.4 G/DL — LOW (ref 11.5–15.5)
INR BLD: 1.12 — SIGNIFICANT CHANGE UP (ref 0.88–1.16)
MAGNESIUM SERPL-MCNC: 1.5 MG/DL — LOW (ref 1.6–2.6)
MCHC RBC-ENTMCNC: 30 PG — SIGNIFICANT CHANGE UP (ref 27–34)
MCHC RBC-ENTMCNC: 31.9 GM/DL — LOW (ref 32–36)
MCV RBC AUTO: 93.9 FL — SIGNIFICANT CHANGE UP (ref 80–100)
NRBC # BLD: 0 /100 WBCS — SIGNIFICANT CHANGE UP (ref 0–0)
PHOSPHATE SERPL-MCNC: 3.1 MG/DL — SIGNIFICANT CHANGE UP (ref 2.5–4.5)
PLATELET # BLD AUTO: 156 K/UL — SIGNIFICANT CHANGE UP (ref 150–400)
POTASSIUM SERPL-MCNC: 3.3 MMOL/L — LOW (ref 3.5–5.3)
POTASSIUM SERPL-MCNC: 4 MMOL/L — SIGNIFICANT CHANGE UP (ref 3.5–5.3)
POTASSIUM SERPL-SCNC: 3.3 MMOL/L — LOW (ref 3.5–5.3)
POTASSIUM SERPL-SCNC: 4 MMOL/L — SIGNIFICANT CHANGE UP (ref 3.5–5.3)
PROTHROM AB SERPL-ACNC: 12.7 SEC — SIGNIFICANT CHANGE UP (ref 10–12.9)
RBC # BLD: 2.8 M/UL — LOW (ref 3.8–5.2)
RBC # FLD: 14.6 % — HIGH (ref 10.3–14.5)
SODIUM SERPL-SCNC: 136 MMOL/L — SIGNIFICANT CHANGE UP (ref 135–145)
SODIUM SERPL-SCNC: 137 MMOL/L — SIGNIFICANT CHANGE UP (ref 135–145)
WBC # BLD: 9.09 K/UL — SIGNIFICANT CHANGE UP (ref 3.8–10.5)
WBC # FLD AUTO: 9.09 K/UL — SIGNIFICANT CHANGE UP (ref 3.8–10.5)

## 2019-10-14 PROCEDURE — 99233 SBSQ HOSP IP/OBS HIGH 50: CPT

## 2019-10-14 PROCEDURE — 93306 TTE W/DOPPLER COMPLETE: CPT | Mod: 26

## 2019-10-14 RX ORDER — POTASSIUM CHLORIDE 20 MEQ
40 PACKET (EA) ORAL ONCE
Refills: 0 | Status: COMPLETED | OUTPATIENT
Start: 2019-10-14 | End: 2019-10-14

## 2019-10-14 RX ORDER — HYDRALAZINE HCL 50 MG
10 TABLET ORAL ONCE
Refills: 0 | Status: COMPLETED | OUTPATIENT
Start: 2019-10-14 | End: 2019-10-14

## 2019-10-14 RX ORDER — LABETALOL HCL 100 MG
5 TABLET ORAL ONCE
Refills: 0 | Status: COMPLETED | OUTPATIENT
Start: 2019-10-14 | End: 2019-10-14

## 2019-10-14 RX ORDER — MAGNESIUM SULFATE 500 MG/ML
2 VIAL (ML) INJECTION ONCE
Refills: 0 | Status: COMPLETED | OUTPATIENT
Start: 2019-10-14 | End: 2019-10-14

## 2019-10-14 RX ADMIN — Medication 40 MILLIEQUIVALENT(S): at 11:29

## 2019-10-14 RX ADMIN — Medication 650 MILLIGRAM(S): at 19:41

## 2019-10-14 RX ADMIN — Medication 50 GRAM(S): at 07:46

## 2019-10-14 RX ADMIN — Medication 10 MILLIGRAM(S): at 09:05

## 2019-10-14 RX ADMIN — ALVIMOPAN 12 MILLIGRAM(S): 12 CAPSULE ORAL at 10:15

## 2019-10-14 RX ADMIN — Medication 650 MILLIGRAM(S): at 18:36

## 2019-10-14 RX ADMIN — Medication 10 MILLIGRAM(S): at 05:14

## 2019-10-14 RX ADMIN — Medication 5 MILLIGRAM(S): at 18:12

## 2019-10-14 RX ADMIN — ALVIMOPAN 12 MILLIGRAM(S): 12 CAPSULE ORAL at 22:02

## 2019-10-14 RX ADMIN — ATENOLOL 50 MILLIGRAM(S): 25 TABLET ORAL at 05:14

## 2019-10-14 RX ADMIN — Medication 40 MILLIEQUIVALENT(S): at 09:21

## 2019-10-14 RX ADMIN — Medication 10 MILLIGRAM(S): at 20:27

## 2019-10-14 NOTE — PROGRESS NOTE ADULT - SUBJECTIVE AND OBJECTIVE BOX
24 hr events:    SUBJECTIVE:      Vital Signs Last 24 Hrs  T(C): 36.4 (14 Oct 2019 04:47), Max: 37.4 (13 Oct 2019 13:46)  T(F): 97.5 (14 Oct 2019 04:47), Max: 99.4 (13 Oct 2019 13:46)  HR: 84 (14 Oct 2019 05:13) (84 - 102)  BP: 186/81 (14 Oct 2019 05:13) (145/68 - 198/80)  BP(mean): 116 (14 Oct 2019 05:13) (97 - 116)  RR: 16 (14 Oct 2019 05:13) (16 - 18)  SpO2: 94% (14 Oct 2019 05:13) (94% - 97%)    Physical Exam:  General: NAD  Pulmonary: Nonlabored breathing, no respiratory distress  Cardiovascular: NSR  Abdominal: soft, NT/ND, no organomegaly  Extremities: WWP, normal strength, no clubbing/cyanosis/edema  Neuro: A/O x3, no focal deficits, normal sensation  Pulses: palpable distal pulses    Lines/drains/tubes:    I&O's Summary    13 Oct 2019 07:01  -  14 Oct 2019 07:00  --------------------------------------------------------  IN: 1356 mL / OUT: 925 mL / NET: 431 mL        LABS:                        8.4    9.09  )-----------( 156      ( 14 Oct 2019 05:46 )             26.3     10-14    136  |  102  |  7   ----------------------------<  89  3.3<L>   |  22  |  0.38<L>    Ca    7.5<L>      14 Oct 2019 05:46  Phos  3.1     10-14  Mg     1.5     10-14      PT/INR - ( 14 Oct 2019 05:46 )   PT: 12.7 sec;   INR: 1.12          PTT - ( 14 Oct 2019 05:46 )  PTT:25.5 sec    CAPILLARY BLOOD GLUCOSE            RADIOLOGY & ADDITIONAL STUDIES: 24 hr events:  ON: 10 hydral and 5 labetalol for , ekg w/ TWI unchanged, d/c PCA (didn't use all day/pain controlled), no bloody BMs  10/13: +1 bloody BM in early AM, +2BMs nonbloody     SUBJECTIVE:  Pt seen and examined by chief resident. Pt is doing well, resting comfortably on bed. Pain controlled. Tolerating sips/chips. +F/+BM without blood. No nausea or vomiting. No complaints at this time.    Vital Signs Last 24 Hrs  T(C): 36.4 (14 Oct 2019 04:47), Max: 37.4 (13 Oct 2019 13:46)  T(F): 97.5 (14 Oct 2019 04:47), Max: 99.4 (13 Oct 2019 13:46)  HR: 84 (14 Oct 2019 05:13) (84 - 102)  BP: 186/81 (14 Oct 2019 05:13) (145/68 - 198/80)  BP(mean): 116 (14 Oct 2019 05:13) (97 - 116)  RR: 16 (14 Oct 2019 05:13) (16 - 18)  SpO2: 94% (14 Oct 2019 05:13) (94% - 97%)    Physical Exam:  General: NAD  Pulmonary: Nonlabored breathing, no respiratory distress  Cardiovascular: NSR on monitor  Abdominal: soft, NT/ND, midline incision with staples c/d/i  Extremities: WWP, SCDs in place      I&O's Summary    13 Oct 2019 07:01  -  14 Oct 2019 07:00  --------------------------------------------------------  IN: 1356 mL / OUT: 925 mL / NET: 431 mL        LABS:                        8.4    9.09  )-----------( 156      ( 14 Oct 2019 05:46 )             26.3     10-14    136  |  102  |  7   ----------------------------<  89  3.3<L>   |  22  |  0.38<L>    Ca    7.5<L>      14 Oct 2019 05:46  Phos  3.1     10-14  Mg     1.5     10-14      PT/INR - ( 14 Oct 2019 05:46 )   PT: 12.7 sec;   INR: 1.12          PTT - ( 14 Oct 2019 05:46 )  PTT:25.5 sec

## 2019-10-14 NOTE — PHYSICAL THERAPY INITIAL EVALUATION ADULT - ADDITIONAL COMMENTS
Pt lives alone in an apt with 49 steps to enter. At baseline, ambulates independently with no DME. Pt states that upon discharge she is considering staying at her son's home (1 flight of stairs.)

## 2019-10-14 NOTE — DIETITIAN INITIAL EVALUATION ADULT. - ADD RECOMMEND
1) Recommend adding Ensure Clears TID (provides 720 kcal, 24 g protein). Pending tolerance to clear liquids, recommend advancing diet to full liquids to DASH/TLC diet. 2) Encourage PO intake 3) Monitor lytes and replete prn (Pt noted with hypokalemia) 4) Obtain and monitor weekly wts.

## 2019-10-14 NOTE — PROGRESS NOTE ADULT - ASSESSMENT
73 yo F with hx of HTN, HLD hemorrhoids, and newly dx'd sigmoid adeno s/p LAR with worsening anemia.  Patient is passing blood/clots.  -labs w/o iron deficiency or elevated ferritin to suggest anemia of chronic inflammation.  -throughout night of 10/11 into 10/12 had sigmoidoscopy- no bleed. Pt then taken for exploratory laparoscopy- no bleed found.  -hapto 130 and ldh 240- not consistent with hemolysis.  -hgb stabalized for past 24 hours.  -no further bloody bowel movements.   -monitor CBC q6 hrs and transfuse to goal Hb of 7.   -discussed case with surgery on 10/13/19- at this time no plan for further surgical exploration.  -so far has received 3 units of PRBC on 10/11/19, 1 unit of PRBC on 10/12/19, and 1 unit of plt on 10/12/19.  -if further transfusions are necessary monitor for dilutional coagulopathy (decline in plts and abnormal coags).  -awaiting final path from LAR.  -tachycardic throughout 10/12/19. Cardiology consulted- pt diagnosed with Afib. Now on beta-blocker. Holding off on starting anticoagulation give hgb instability.       Thank you for allowing me to participate in her care.  Please feel free to call w/any questions.    Gary Hansen MD covering for Nicole Ordonez MD  [facility] Attending  [phone]

## 2019-10-14 NOTE — PHYSICAL THERAPY INITIAL EVALUATION ADULT - DID THE PATIENT HAVE SURGERY?
Laparoscopic assisted lower anterior resection, followed by diagnostic lap and flexible sigmoidoscopy on 10/12/yes

## 2019-10-14 NOTE — CHART NOTE - NSCHARTNOTEFT_GEN_A_CORE
Upon Nutritional Assessment by the Registered Dietitian your patient was determined to meet criteria / has evidence of the following diagnosis/diagnoses:          [ ]  Mild Protein Calorie Malnutrition        [ X ]  Moderate Protein Calorie Malnutrition        [ ] Severe Protein Calorie Malnutrition        [ ] Unspecified Protein Calorie Malnutrition        [ ] Underweight / BMI <19        [ ] Morbid Obesity / BMI > 40      Findings as based on:  •  Comprehensive nutrition assessment and consultation  1) <75% EER x >7 days.   2) 3.6% wt loss x 1 week.   3) Mild muscle wasting in temporal region; Suspect moderate PCM in context of acute illness based on pt meeting 3 criteria.       Treatment:    The following diet has been recommended:  1) Recommend adding Ensure Clears TID (provides 720 kcal, 24 g protein). Pending tolerance to clear liquids, recommend advancing diet to full liquids to DASH/TLC diet.   2) Encourage PO intake.   3) Monitor lytes and replete prn (Pt noted with hypokalemia).   4) Obtain and monitor weekly wts.    PROVIDER Section:     By signing this assessment you are acknowledging and agree with the diagnosis/diagnoses assigned by the Registered Dietitian    Comments:

## 2019-10-14 NOTE — PROGRESS NOTE ADULT - ATTENDING COMMENTS
Examined the patient this afternoon at bedside with the cardiology fellow  The patient denies chest pain, shortness of breath, palpitations.  Agree with plan as above    The patient would like to discuss anticoagulation with her primary cardiologist prior to initiating it. She understands the risks of holding anticoagulation.   She denies any chest pain, reports normal coronary angiogram in 2006.   Would recommend following as an outpatient for further risk stratification for CAD with her primary cardiologist

## 2019-10-14 NOTE — DIETITIAN INITIAL EVALUATION ADULT. - ENERGY NEEDS
Ht: 65 in Wt: 188lbs IBW: 125lbs (+/-10%), 150%IBW, BMI: 31.3 kg/m2   IBW (56.6 kg) used for calculations as pt >120% of IBW.  Nutrient needs based on [facility] standards of care for older adults. Needs adjusted for suspected malnutrition.

## 2019-10-14 NOTE — PROGRESS NOTE ADULT - SUBJECTIVE AND OBJECTIVE BOX
Interval history:  Uncomfortable this morning. Reports poor sleep and fatigue. No other new complaints.       HPI: 75 yo F with hx of HTN, HLD and hemorrhoids who developed BRBPR over the summer which she thought was from hemorrhoids, however it persisted.  She had her first colonoscopy ever in September which showed a sigmoid/rectal mass w/bx showing adenocarcinoma.  6 polyps were also removed during the procedure. Metastatic workup was negative as per notes.  10/10 she was admitted for LAR.  She reports that since yesterday she has thought she was going to have a BM 7 times but has just passed blood and clots.   She otherwise feels fine - no SOB, dizziness, lightheadedness.  Some abdominal pain at surgical site that she says is tolerable.    ROS: 12 pt ROS performed, neg except as per HPI.    PMH:  Basal cell carcinoma of forehead   otitis bilateral  Hypertension  Malignant neoplasm of descending colon   Other hyperlipidemia.     PSH:   History of right cataract extraction   History of surgery basal cell removal from forehead  History of surgery Cardiac catherization - negative    SH:  Former smoker-- quit 30 years ago  Retired .    Allergies: none    Home Medications:   * Patient Currently Takes Medications as of 10-Oct-2019 11:26 documented in Structured Notes  · 	enalapril 10 mg oral tablet: Last Dose Taken: 10-Oct-2019 AM, 1 tab(s) orally once a day  · 	atenolol 50 mg oral tablet: Last Dose Taken: 10-Oct-2019 AM, 1 tab(s) orally once a day  · 	simvastatin 40 mg oral tablet: Last Dose Taken: 09-Oct-2019 PM, 1 tab(s) orally once a day (at bedtime)  · 	Aspirin Enteric Coated 81 mg oral delayed release tablet: Last Dose Taken: 04-Oct-2019 , 1 tab(s) orally once a day    Current meds  MEDICATIONS  (STANDING):  acetaminophen   Tablet .. 650 milliGRAM(s) Oral every 6 hours  alvimopan 12 milliGRAM(s) Oral two times a day  ATENolol  Tablet 50 milliGRAM(s) Oral daily  BUpivacaine liposome 1.3% Injectable (no eMAR) 20 milliLiter(s) Local Injection once  enalapril 10 milliGRAM(s) Oral daily  gabapentin 300 milliGRAM(s) Oral every 6 hours  HYDROmorphone PCA (1 mG/mL) 30 milliLiter(s) PCA Continuous PCA Continuous  lactated ringers. 1000 milliLiter(s) (125 mL/Hr) IV Continuous <Continuous>    MEDICATIONS  (PRN):  ondansetron Injectable 4 milliGRAM(s) IV Push every 6 hours PRN Nausea    Exam:  Vital Signs Last 24 Hrs  T(C): 37.6 (14 Oct 2019 14:06), Max: 37.6 (14 Oct 2019 14:06)  T(F): 99.6 (14 Oct 2019 14:06), Max: 99.6 (14 Oct 2019 14:06)  HR: 90 (14 Oct 2019 13:39) (84 - 96)  BP: 160/70 (14 Oct 2019 13:39) (160/70 - 198/80)  BP(mean): 100 (14 Oct 2019 13:39) (99 - 116)  RR: 18 (14 Oct 2019 13:39) (16 - 18)  SpO2: 96% (14 Oct 2019 13:39) (94% - 97%)  Gen: well appearing, NAD while lying down, pale  HEENT: MMM  Card: rrr, s1/s2, no mrg  Lungs: ctab  Abd: +BS, soft, diffusely with mild tenderness but no rebound or guarding  LE: no edema  Neuro: aaox3, moving all extremities    Labs:    Hgb 8.4  WBC 9.0  plt 156  Hapto 138  ldh 240      Path:  Descending colon polyp, polypectomy ([id number]):  - Fragments of hyperplastic polyp.    Descending colon polyp, polypectomy ([id number]):  - Tubular adenoma.    Descending colon polyp, polypectomy ([id number]):  - Tubulovillous adenoma.    Descending colon polyp, polypectomy ([id number]):  - Tubular adenoma.    Descending colon polyp, polypectomy ([id number]):  - Tubular adenoma.    Descending colon polyp, polypectomy ([id number]):  - Tubulovillous adenoma.    Descending colon mass, biopsy ([id number]):  - Superficial fragments of colonic mucosa with the least  intramucosal adenocarcinoma.

## 2019-10-14 NOTE — PHYSICAL THERAPY INITIAL EVALUATION ADULT - PERTINENT HX OF CURRENT PROBLEM, REHAB EVAL
75 yo F hx HTN, HLD with sigmoid mass here for resection. Postop course c/b BRBPR, s/p RTOR for flex sig and dx laparoscopy with no active bleeding found, H/H now stable

## 2019-10-14 NOTE — DIETITIAN INITIAL EVALUATION ADULT. - ENERGY INTAKE
Good (>75%) Pt noted previously on clear liquids (10/10-10/11), NPO (10/11-10/14), now on clears (10/14).

## 2019-10-14 NOTE — PROGRESS NOTE ADULT - ASSESSMENT
74F w/HTN, HLD adm for resection of sigmoid mass complicated by LGIB. Cardiology consulted for tachycardia evaluation    #Atrial fibrillation - new onset, post-operative. CHADSVASc of 3. Patient not willing to start anticoagulation; reports many falls. Would like to talk to her personal cardiologist as outpatient before initiation   - c/w home dose atenolol 50 qd  - Recommend anticoagulation initiation w/ eliquis 5mg bid when deemed appropriate by surgical service and if patient agrees   - official echocardiogram    #HTN: above goal, may be pain related  - can increase Enalapril 20mg daily     Note Incomplete 74F w/HTN, HLD adm for resection of sigmoid mass complicated by LGIB. Cardiology consulted for tachycardia evaluation    #Atrial fibrillation - new onset, post-operative. CHADSVASc of 3. Patient not willing to start anticoagulation; reports many falls. Would like to talk to her personal cardiologist as outpatient before initiation   - c/w home dose atenolol 50 qd  - Recommend anticoagulation initiation w/ eliquis 5mg bid when deemed appropriate by surgical service and if patient agrees     #HTN: above goal, may be pain related  - can increase Enalapril 20mg daily     #T-wave inversions: no chest pain. Normal cardiac cath in 2006. New ECG changes this admission  - has cardiologist, will need to follow up as outpatient for nuclear stress test to further evaluate

## 2019-10-14 NOTE — DIETITIAN INITIAL EVALUATION ADULT. - MALNUTRITION
1) <75% EER x >7 days 2) 3.6% wt loss x 1 week 3) Mild muscle wasting in temporal region; Suspect moderate PCM in context of acute illness based on pt meeting 3 criteria (suspect moderate PCM in context of acute illness)

## 2019-10-14 NOTE — PHYSICAL THERAPY INITIAL EVALUATION ADULT - GENERAL OBSERVATIONS, REHAB EVAL
Pt received semi supine, +abdominal incision bandage C/D/I, +telemetry, +pulse ox, +heplock, +b/l SCDs, NAD, agreeable to PT.

## 2019-10-14 NOTE — PROGRESS NOTE ADULT - ASSESSMENT
75 yo F hx HTN, HLD with sigmoid mass here for resection. Postop course c/b BRBPR, s/p RTOR for flex sig and dx laparoscopy with no active bleeding found, H/H now stable    -ERAS  -Colorectal bundle  - Monitor H/H  - AM labs 75 yo F hx HTN, HLD with sigmoid mass here for resection. Postop course c/b BRBPR, s/p RTOR for flex sig and dx laparoscopy with no active bleeding found, H/H now stable    - H/H stable, monitoring daily labs  - ECHO ordered   - ERAS  - Colorectal bundle  - PT consult/OOB to chair  - AM labs

## 2019-10-14 NOTE — DIETITIAN INITIAL EVALUATION ADULT. - OTHER INFO
Pt is a 74 y.o F with hx of HTN, HLD and hemorrhoids, p/w sigmoid mass, admitted for resection. Pt now s/p lap assisted LAR 10/10. Postop course c/b BRBPR, s/p RTOR 10/12 for flex sigmoidoscopy and dx laparoscopy with no active bleeding found, H/H now stable noted.    Pt seen resting in bed. Pt reports decreased appetite and <75% PO intake PTA x 2 weeks and suspects 2/2 nerves after diagnosis of sigmoid mass. Pt reports ordering in food (hamburger, salads) for meals PTA. Confirms NKFA. Denies chewing/swallowing issues. Pt states UBW 195lbs (10/3-per pt, weighed at PCP) and suspects wt loss; Pt now noted w/ 188lbs (10/10), indicating 3.6% wt loss x 1 week. Nutrition focused physical exam conducted and mild temporal wasting noted; Suspect moderate malnutrition in context of acute illness based on pt meeting 3 criteria; Please see malnutrition note. Pt endorses improved appetite at present, consumed >75% of clear liquids tray this AM. No pain reported at this time. Denies N/V/D/C. +BM 10/14. Discussed current diet order and diet progression. Encouraged PO intake and discussed ONS. Skin: surgical incision noted. Per pt's request, left heart-healthy nutrition therapy handouts at bedside for pt to look over. Pt declined to have nutrition-related questions at this time. RD to f/u per protocol.

## 2019-10-14 NOTE — PROGRESS NOTE ADULT - SUBJECTIVE AND OBJECTIVE BOX
Subjective:  Seen and examined  Mild post-surgical abdominal discomfort   No CP or SOB    PAST MEDICAL & SURGICAL HISTORY:  Malignant neoplasm of descending colon  History of otitis: bilateral  Basal cell carcinoma of forehead  Hypertension, unspecified type  Other hyperlipidemia  History of surgery: Cardiac catherization  History of surgery: basal cell removal from forehead  History of right cataract extraction      MEDICATIONS  (STANDING):  acetaminophen   Tablet .. 650 milliGRAM(s) Oral every 6 hours  alvimopan 12 milliGRAM(s) Oral two times a day  ATENolol  Tablet 50 milliGRAM(s) Oral daily  BUpivacaine liposome 1.3% Injectable (no eMAR) 20 milliLiter(s) Local Injection once  enalapril 10 milliGRAM(s) Oral daily  gabapentin 300 milliGRAM(s) Oral every 6 hours  lactated ringers. 1000 milliLiter(s) (50 mL/Hr) IV Continuous <Continuous>      Vital Signs Last 24 Hrs  T(C): 36.6 (14 Oct 2019 09:21), Max: 37.4 (13 Oct 2019 13:46)  T(F): 97.9 (14 Oct 2019 09:21), Max: 99.4 (13 Oct 2019 13:46)  HR: 90 (14 Oct 2019 10:05) (84 - 100)  BP: 169/72 (14 Oct 2019 10:05) (162/69 - 198/80)  BP(mean): 104 (14 Oct 2019 10:05) (99 - 116)  RR: 16 (14 Oct 2019 10:05) (16 - 18)  SpO2: 97% (14 Oct 2019 10:05) (94% - 97%)    Daily     Daily     Physical Exam:   GEN: NAD, AAOx3  HEENT: MMM, no icterus  CV: S1 S2 RRR, no MRG  Lung: CTAB  Abd: soft NT ND +BS  Ext: no c/c/e  Neuro: no focal neuro deficit      LABS:                        8.4    9.09  )-----------( 156      ( 14 Oct 2019 05:46 )             26.3     10-14    136  |  102  |  7   ----------------------------<  89  3.3<L>   |  22  |  0.38<L>    Ca    7.5<L>      14 Oct 2019 05:46  Phos  3.1     10-14  Mg     1.5     10-14          PT/INR - ( 14 Oct 2019 05:46 )   PT: 12.7 sec;   INR: 1.12          PTT - ( 14 Oct 2019 05:46 )  PTT:25.5 sec    I&O's Detail    13 Oct 2019 07:01  -  14 Oct 2019 07:00  --------------------------------------------------------  IN:    IV PiggyBack: 356 mL    lactated ringers.: 1000 mL  Total IN: 1356 mL    OUT:    Indwelling Catheter - Urethral: 925 mL  Total OUT: 925 mL    Total NET: 431 mL      14 Oct 2019 07:01  -  14 Oct 2019 11:43  --------------------------------------------------------  IN:    Oral Fluid: 180 mL  Total IN: 180 mL    OUT:  Total OUT: 0 mL    Total NET: 180 mL              RADIOLOGY & ADDITIONAL STUDIES: Subjective:  Seen and examined  Mild post-surgical abdominal discomfort   No CP or SOB    PAST MEDICAL & SURGICAL HISTORY:  Malignant neoplasm of descending colon  History of otitis: bilateral  Basal cell carcinoma of forehead  Hypertension, unspecified type  Other hyperlipidemia  History of surgery: Cardiac catherization  History of surgery: basal cell removal from forehead  History of right cataract extraction      MEDICATIONS  (STANDING):  acetaminophen   Tablet .. 650 milliGRAM(s) Oral every 6 hours  alvimopan 12 milliGRAM(s) Oral two times a day  ATENolol  Tablet 50 milliGRAM(s) Oral daily  BUpivacaine liposome 1.3% Injectable (no eMAR) 20 milliLiter(s) Local Injection once  enalapril 10 milliGRAM(s) Oral daily  gabapentin 300 milliGRAM(s) Oral every 6 hours  lactated ringers. 1000 milliLiter(s) (50 mL/Hr) IV Continuous <Continuous>      Vital Signs Last 24 Hrs  T(C): 36.6 (14 Oct 2019 09:21), Max: 37.4 (13 Oct 2019 13:46)  T(F): 97.9 (14 Oct 2019 09:21), Max: 99.4 (13 Oct 2019 13:46)  HR: 90 (14 Oct 2019 10:05) (84 - 100)  BP: 169/72 (14 Oct 2019 10:05) (162/69 - 198/80)  BP(mean): 104 (14 Oct 2019 10:05) (99 - 116)  RR: 16 (14 Oct 2019 10:05) (16 - 18)  SpO2: 97% (14 Oct 2019 10:05) (94% - 97%)    Daily     Daily     Physical Exam:   GEN: NAD, AAOx3  HEENT: MMM, no icterus  CV: S1 S2 RRR, no MRG  Lung: CTAB  Abd: soft NT ND +BS  Ext: no c/c/e  Neuro: no focal neuro deficit      LABS:                        8.4    9.09  )-----------( 156      ( 14 Oct 2019 05:46 )             26.3     10-14    136  |  102  |  7   ----------------------------<  89  3.3<L>   |  22  |  0.38<L>    Ca    7.5<L>      14 Oct 2019 05:46  Phos  3.1     10-14  Mg     1.5     10-14          PT/INR - ( 14 Oct 2019 05:46 )   PT: 12.7 sec;   INR: 1.12          PTT - ( 14 Oct 2019 05:46 )  PTT:25.5 sec    I&O's Detail    13 Oct 2019 07:01  -  14 Oct 2019 07:00  --------------------------------------------------------  IN:    IV PiggyBack: 356 mL    lactated ringers.: 1000 mL  Total IN: 1356 mL    OUT:    Indwelling Catheter - Urethral: 925 mL  Total OUT: 925 mL    Total NET: 431 mL      14 Oct 2019 07:01  -  14 Oct 2019 11:43  --------------------------------------------------------  IN:    Oral Fluid: 180 mL  Total IN: 180 mL    OUT:  Total OUT: 0 mL    Total NET: 180 mL              RADIOLOGY & ADDITIONAL STUDIES:  < from: Echocardiogram (10.14.19 @ 12:16) >  CONCLUSIONS:     1. The left ventricle is normal in size and systolic function with a   calculated ejection fraction of 60.0 %. There is mild symmetric left   ventricular hypertrophy.   2. The right ventricle is normal in size and systolic function.   3. There is trace mitral regurgitation.   4. There is trace tricuspid regurgitation.   5. Small pericardial effusion adjacent to the right atrium cannot be   excluded due to poor visualization of the right side.   6. No evidence of pulmonary hypertension.    < end of copied text >

## 2019-10-15 LAB
ANION GAP SERPL CALC-SCNC: 12 MMOL/L — SIGNIFICANT CHANGE UP (ref 5–17)
ANION GAP SERPL CALC-SCNC: 12 MMOL/L — SIGNIFICANT CHANGE UP (ref 5–17)
BUN SERPL-MCNC: 5 MG/DL — LOW (ref 7–23)
BUN SERPL-MCNC: 6 MG/DL — LOW (ref 7–23)
CALCIUM SERPL-MCNC: 7.8 MG/DL — LOW (ref 8.4–10.5)
CALCIUM SERPL-MCNC: 8 MG/DL — LOW (ref 8.4–10.5)
CHLORIDE SERPL-SCNC: 98 MMOL/L — SIGNIFICANT CHANGE UP (ref 96–108)
CHLORIDE SERPL-SCNC: 99 MMOL/L — SIGNIFICANT CHANGE UP (ref 96–108)
CO2 SERPL-SCNC: 23 MMOL/L — SIGNIFICANT CHANGE UP (ref 22–31)
CO2 SERPL-SCNC: 23 MMOL/L — SIGNIFICANT CHANGE UP (ref 22–31)
CREAT SERPL-MCNC: 0.37 MG/DL — LOW (ref 0.5–1.3)
CREAT SERPL-MCNC: 0.39 MG/DL — LOW (ref 0.5–1.3)
GLUCOSE SERPL-MCNC: 133 MG/DL — HIGH (ref 70–99)
GLUCOSE SERPL-MCNC: 93 MG/DL — SIGNIFICANT CHANGE UP (ref 70–99)
HCT VFR BLD CALC: 26.2 % — LOW (ref 34.5–45)
HGB BLD-MCNC: 8.6 G/DL — LOW (ref 11.5–15.5)
MAGNESIUM SERPL-MCNC: 1.4 MG/DL — LOW (ref 1.6–2.6)
MAGNESIUM SERPL-MCNC: 1.5 MG/DL — LOW (ref 1.6–2.6)
MCHC RBC-ENTMCNC: 30.4 PG — SIGNIFICANT CHANGE UP (ref 27–34)
MCHC RBC-ENTMCNC: 32.8 GM/DL — SIGNIFICANT CHANGE UP (ref 32–36)
MCV RBC AUTO: 92.6 FL — SIGNIFICANT CHANGE UP (ref 80–100)
NRBC # BLD: 0 /100 WBCS — SIGNIFICANT CHANGE UP (ref 0–0)
PHOSPHATE SERPL-MCNC: 2.8 MG/DL — SIGNIFICANT CHANGE UP (ref 2.5–4.5)
PHOSPHATE SERPL-MCNC: 3.3 MG/DL — SIGNIFICANT CHANGE UP (ref 2.5–4.5)
PLATELET # BLD AUTO: 174 K/UL — SIGNIFICANT CHANGE UP (ref 150–400)
POTASSIUM SERPL-MCNC: 3 MMOL/L — LOW (ref 3.5–5.3)
POTASSIUM SERPL-MCNC: 3.6 MMOL/L — SIGNIFICANT CHANGE UP (ref 3.5–5.3)
POTASSIUM SERPL-SCNC: 3 MMOL/L — LOW (ref 3.5–5.3)
POTASSIUM SERPL-SCNC: 3.6 MMOL/L — SIGNIFICANT CHANGE UP (ref 3.5–5.3)
RBC # BLD: 2.83 M/UL — LOW (ref 3.8–5.2)
RBC # FLD: 14.6 % — HIGH (ref 10.3–14.5)
SODIUM SERPL-SCNC: 133 MMOL/L — LOW (ref 135–145)
SODIUM SERPL-SCNC: 134 MMOL/L — LOW (ref 135–145)
SURGICAL PATHOLOGY STUDY: SIGNIFICANT CHANGE UP
WBC # BLD: 8.43 K/UL — SIGNIFICANT CHANGE UP (ref 3.8–10.5)
WBC # FLD AUTO: 8.43 K/UL — SIGNIFICANT CHANGE UP (ref 3.8–10.5)

## 2019-10-15 PROCEDURE — 99232 SBSQ HOSP IP/OBS MODERATE 35: CPT

## 2019-10-15 RX ORDER — AMLODIPINE BESYLATE 2.5 MG/1
5 TABLET ORAL DAILY
Refills: 0 | Status: DISCONTINUED | OUTPATIENT
Start: 2019-10-15 | End: 2019-10-17

## 2019-10-15 RX ORDER — MAGNESIUM SULFATE 500 MG/ML
2 VIAL (ML) INJECTION EVERY 6 HOURS
Refills: 0 | Status: COMPLETED | OUTPATIENT
Start: 2019-10-15 | End: 2019-10-15

## 2019-10-15 RX ORDER — LABETALOL HCL 100 MG
5 TABLET ORAL
Refills: 0 | Status: DISCONTINUED | OUTPATIENT
Start: 2019-10-15 | End: 2019-10-17

## 2019-10-15 RX ORDER — LABETALOL HCL 100 MG
5 TABLET ORAL ONCE
Refills: 0 | Status: DISCONTINUED | OUTPATIENT
Start: 2019-10-15 | End: 2019-10-15

## 2019-10-15 RX ORDER — MAGNESIUM SULFATE 500 MG/ML
2 VIAL (ML) INJECTION EVERY 6 HOURS
Refills: 0 | Status: COMPLETED | OUTPATIENT
Start: 2019-10-15 | End: 2019-10-16

## 2019-10-15 RX ORDER — POTASSIUM CHLORIDE 20 MEQ
40 PACKET (EA) ORAL ONCE
Refills: 0 | Status: COMPLETED | OUTPATIENT
Start: 2019-10-15 | End: 2019-10-15

## 2019-10-15 RX ORDER — POTASSIUM CHLORIDE 20 MEQ
40 PACKET (EA) ORAL
Refills: 0 | Status: COMPLETED | OUTPATIENT
Start: 2019-10-15 | End: 2019-10-15

## 2019-10-15 RX ORDER — SODIUM,POTASSIUM PHOSPHATES 278-250MG
1 POWDER IN PACKET (EA) ORAL ONCE
Refills: 0 | Status: COMPLETED | OUTPATIENT
Start: 2019-10-15 | End: 2019-10-15

## 2019-10-15 RX ADMIN — ATENOLOL 50 MILLIGRAM(S): 25 TABLET ORAL at 05:35

## 2019-10-15 RX ADMIN — ALVIMOPAN 12 MILLIGRAM(S): 12 CAPSULE ORAL at 10:02

## 2019-10-15 RX ADMIN — Medication 1 PACKET(S): at 22:10

## 2019-10-15 RX ADMIN — Medication 40 MILLIEQUIVALENT(S): at 12:52

## 2019-10-15 RX ADMIN — Medication 650 MILLIGRAM(S): at 12:52

## 2019-10-15 RX ADMIN — Medication 50 GRAM(S): at 18:50

## 2019-10-15 RX ADMIN — Medication 20 MILLIGRAM(S): at 05:35

## 2019-10-15 RX ADMIN — ALVIMOPAN 12 MILLIGRAM(S): 12 CAPSULE ORAL at 21:17

## 2019-10-15 RX ADMIN — Medication 650 MILLIGRAM(S): at 18:50

## 2019-10-15 RX ADMIN — Medication 650 MILLIGRAM(S): at 19:00

## 2019-10-15 RX ADMIN — Medication 40 MILLIEQUIVALENT(S): at 10:03

## 2019-10-15 RX ADMIN — Medication 650 MILLIGRAM(S): at 13:22

## 2019-10-15 RX ADMIN — AMLODIPINE BESYLATE 5 MILLIGRAM(S): 2.5 TABLET ORAL at 18:50

## 2019-10-15 RX ADMIN — Medication 50 GRAM(S): at 21:16

## 2019-10-15 RX ADMIN — Medication 40 MILLIEQUIVALENT(S): at 21:17

## 2019-10-15 RX ADMIN — Medication 50 GRAM(S): at 12:51

## 2019-10-15 NOTE — PROGRESS NOTE ADULT - ASSESSMENT
73 yo F hx HTN, HLD with sigmoid mass here for resection. Postop course c/b BRBPR s/p RTOR for flex sig and dx laparoscopy with no active bleeding found, H/H now stable    - pain/nausea control PRN  - SCDs/OOB  - ISS  - CLD  - Monitor H/H  - AM labs

## 2019-10-15 NOTE — PROGRESS NOTE ADULT - ASSESSMENT
75 yo F with hx of HTN, HLD hemorrhoids, and newly dx'd sigmoid adeno s/p LAR with worsening anemia while passing blood per rectum.  Now improved, Hb stable.  -no further bloody bowel movements.   -monitor CBC q12 hrs and transfuse to goal Hb of 7.   -awaiting final path from LAR.  -Afib. Now on beta-blocker. Holding off on starting anticoagulation give hgb instability.     Thank you for allowing me to participate in her care.  Please feel free to call w/any questions.    Nicole Ordonez MD  [facility] Attending  [phone]

## 2019-10-15 NOTE — PROGRESS NOTE ADULT - SUBJECTIVE AND OBJECTIVE BOX
Subjective: Patient seen and examined at bedside. No acute events overnight. Denies CP, SOB, palpitations, lightheadedness. No acute complaints     PAST MEDICAL & SURGICAL HISTORY:  Malignant neoplasm of descending colon  History of otitis: bilateral  Basal cell carcinoma of forehead  Hypertension, unspecified type  Other hyperlipidemia  History of surgery: Cardiac catherization  History of surgery: basal cell removal from forehead  History of right cataract extraction      MEDICATIONS  (STANDING):  acetaminophen   Tablet .. 650 milliGRAM(s) Oral every 6 hours  alvimopan 12 milliGRAM(s) Oral two times a day  ATENolol  Tablet 50 milliGRAM(s) Oral daily  BUpivacaine liposome 1.3% Injectable (no eMAR) 20 milliLiter(s) Local Injection once  enalapril 20 milliGRAM(s) Oral daily  gabapentin 300 milliGRAM(s) Oral every 6 hours  lactated ringers. 1000 milliLiter(s) (50 mL/Hr) IV Continuous <Continuous>  magnesium sulfate  IVPB 2 Gram(s) IV Intermittent every 6 hours  potassium chloride   Powder 40 milliEquivalent(s) Oral every 2 hours      Vital Signs Last 24 Hrs  T(C): 36.9 (15 Oct 2019 09:06), Max: 37.6 (14 Oct 2019 14:06)  T(F): 98.5 (15 Oct 2019 09:06), Max: 99.6 (14 Oct 2019 14:06)  HR: 82 (15 Oct 2019 08:17) (82 - 96)  BP: 160/69 (15 Oct 2019 08:17) (146/65 - 179/72)  BP(mean): 99 (15 Oct 2019 08:17) (94 - 104)  RR: 16 (15 Oct 2019 08:17) (16 - 18)  SpO2: 97% (15 Oct 2019 08:17) (94% - 97%)    Daily     Daily Weight in k.6 (14 Oct 2019 15:34)    I&O's Detail    14 Oct 2019 07:01  -  15 Oct 2019 07:00  --------------------------------------------------------  IN:    lactated ringers.: 1200 mL    Oral Fluid: 860 mL  Total IN: 2060 mL    OUT:    Voided: 900 mL  Total OUT: 900 mL    Total NET: 1160 mL      15 Oct 2019 07:01  -  15 Oct 2019 10:57  --------------------------------------------------------  IN:    lactated ringers.: 150 mL  Total IN: 150 mL    OUT:  Total OUT: 0 mL    Total NET: 150 mL    PHYSICAL EXAM:  GENERAL: NAD, well-groomed, well-developed  HEAD:  Atraumatic, Normocephalic  EYES: EOMI, PERRLA, conjunctiva and sclera clear  ENMT: Moist mucous membranes  NECK: Supple, No JVD  CHEST/LUNG: Bibasilar crackles appreciated, no wheezes, no apparent respiratory distress  HEART: Regular rate and rhythm; Normal S1S2; No murmurs, rubs, or gallops  ABDOMEN: Soft, TTP near incisions, Nondistended; Bowel sounds present; No masses or HSM; midline incision with stables, ecchymoses surrounding site    EXTREMITIES: No clubbing, cyanosis, or edema  NERVOUS SYSTEM: Awake and alert, CN grossly intact, no focal deficits   SKIN: No rashes or lesions    LABS:                        8.6    8.43  )-----------( 174      ( 15 Oct 2019 05:55 )             26.2     10-15    133<L>  |  98  |  6<L>  ----------------------------<  93  3.0<L>   |  23  |  0.39<L>    Ca    7.8<L>      15 Oct 2019 05:55  Phos  3.3     10-15  Mg     1.4     10-15    PT/INR - ( 14 Oct 2019 05:46 )   PT: 12.7 sec;   INR: 1.12       PTT - ( 14 Oct 2019 05:46 )  PTT:25.5 sec    RADIOLOGY & ADDITIONAL STUDIES:  < from: Echocardiogram (10.14.19 @ 12:16) >  CONCLUSIONS:     1. The left ventricle is normal in size and systolic function with a   calculated ejection fraction of 60.0 %. There is mild symmetric left   ventricular hypertrophy.   2. The right ventricle is normal in size and systolic function.   3. There is trace mitral regurgitation.   4. There is trace tricuspid regurgitation.   5. Small pericardial effusion adjacent to the right atrium cannot be   excluded due to poor visualization of the right side.   6. No evidence of pulmonary hypertension.    < end of copied text >

## 2019-10-15 NOTE — PROGRESS NOTE ADULT - ASSESSMENT
74F w/HTN, HLD adm for resection of sigmoid mass complicated by LGIB. Cardiology consulted for tachycardia evaluation. Echocardiogram demonstrated EF 60% with mild LVH.     #Atrial fibrillation - new onset, post-operative. CHADSVASc of 3. Patient not willing to start anticoagulation; reports many falls. Would like to talk to her personal cardiologist as outpatient before initiation   - c/w home dose atenolol 50 qd  - Recommend anticoagulation initiation w/ eliquis 5mg bid when deemed appropriate by surgical service and if patient agrees     #HTN: above goal, may be pain related  - Continue with enalapril 20 mg PO daily   - Recommend adding amlodipine 5mg PO daily as patient remains hypertensive despite increase in enalapril    #T-wave inversions: no chest pain. Normal cardiac cath in 2006. New ECG changes this admission  - has cardiologist, will need to follow up as outpatient for nuclear stress test to further evaluate 74F w/HTN, HLD adm for resection of sigmoid mass complicated by LGIB. Cardiology consulted for tachycardia evaluation. Echocardiogram demonstrated EF 60% with mild LVH.     #Atrial fibrillation - new onset, post-operative. CHADSVASc of 3. Patient not willing to start anticoagulation; reports many falls. Would like to talk to her personal cardiologist as outpatient before initiation   - c/w home dose atenolol 50 qd  - Recommend anticoagulation initiation w/ eliquis 5mg bid when deemed appropriate by surgical service and if patient agrees     #HTN: above goal  - Continue with enalapril 20 mg PO daily   - Recommend adding amlodipine 5mg PO daily as patient remains hypertensive despite increase in enalapril    #T-wave inversions: no chest pain. Normal cardiac cath in 2006. New ECG changes this admission  - has cardiologist, will need to follow up as outpatient for nuclear stress test to further evaluate 74F w/HTN, HLD adm for resection of sigmoid mass complicated by LGIB. Cardiology consulted for tachycardia evaluation. Echocardiogram demonstrated EF 60% with mild LVH.     #Atrial fibrillation - new onset, post-operative. CHADSVASc of 3. Patient not willing to start anticoagulation; reports many falls. Would like to talk to her personal cardiologist as outpatient before initiation   - c/w home dose atenolol 50 qd  - Recommend anticoagulation initiation w/ eliquis 5mg bid when deemed appropriate by surgical service and if patient agrees     #HTN: above goal  - Continue with enalapril 20 mg PO daily   - Recommend adding amlodipine 5mg PO daily as patient remains hypertensive despite increase in enalapril    #T-wave inversions: no chest pain. Normal cardiac cath in 2006. New ECG changes this admission  - has cardiologist, will need to follow up as outpatient for nuclear stress test to further evaluate     Please call back for any questions or concerns

## 2019-10-15 NOTE — PROGRESS NOTE ADULT - SUBJECTIVE AND OBJECTIVE BOX
24 hr events:  ON: passed TOV, 10 hydral , , 238 on PVR-- no straight cath  10/14: hydral 10 for , adv CLD, inc home enalapril to 20mg daily, ECHO: EF 60%, DC'ed fitzpatrick,  without sig pain, 5mg IV labetalol for , refusing tylenol/gabapentin    SUBJECTIVE:  Pt seen and examined by chief resident. Pt is doing well, resting comfortably on bed. Pain controlled. Diet tolerated but minimal appetite. Ambulated yesterday to stretcher only. +F/+BM. No nausea or vomiting. No complaints at this time.      Vital Signs Last 24 Hrs  T(C): 36.8 (15 Oct 2019 05:42), Max: 37.6 (14 Oct 2019 14:06)  T(F): 98.2 (15 Oct 2019 05:42), Max: 99.6 (14 Oct 2019 14:06)  HR: 96 (15 Oct 2019 04:15) (84 - 96)  BP: 168/72 (15 Oct 2019 04:15) (146/65 - 189/79)  BP(mean): 103 (15 Oct 2019 04:15) (94 - 114)  RR: 16 (15 Oct 2019 04:15) (16 - 18)  SpO2: 95% (15 Oct 2019 04:15) (94% - 97%)    Physical Exam:  General: NAD  Pulmonary: Nonlabored breathing, no respiratory distress  Cardiovascular: NSR on monitor  Abdominal: soft, NT/ND, midline incision with staples c/d/i  Extremities: WWP,         I&O's Summary    14 Oct 2019 07:01  -  15 Oct 2019 07:00  --------------------------------------------------------  IN: 2060 mL / OUT: 900 mL / NET: 1160 mL        LABS:                        8.6    8.43  )-----------( 174      ( 15 Oct 2019 05:55 )             26.2     10-15    133<L>  |  98  |  6<L>  ----------------------------<  93  3.0<L>   |  23  |  0.39<L>    Ca    7.8<L>      15 Oct 2019 05:55  Phos  3.3     10-15  Mg     1.4     10-15      PT/INR - ( 14 Oct 2019 05:46 )   PT: 12.7 sec;   INR: 1.12          PTT - ( 14 Oct 2019 05:46 )  PTT:25.5 sec

## 2019-10-15 NOTE — PROGRESS NOTE ADULT - ATTENDING COMMENTS
Examined the patient this afternoon at bedside with the cardiology fellow.   She denies chest pain or shortness of breath, palpitations  EKG and labs reviewed    Agree with plan as above.  The patient confirms that she would not want to start anticoagulation prior to discussing it with her cardiologist. She understands the risk of not initiating it (explained risk of CVA and the patient expressed understanding)  Agree with better pressure control with addition of amlodipine 5 mg daily, which will  likely need to be up titrated   -continue with enalapril 20 mg p.o. daily

## 2019-10-15 NOTE — PROGRESS NOTE ADULT - SUBJECTIVE AND OBJECTIVE BOX
Interval history:  Feeling better today.  BMs all brown, no further blood.  Feels like she needs more PT - concerned about going home since she lives alone.    HPI: 75 yo F with hx of HTN, HLD and hemorrhoids who developed BRBPR over the summer which she thought was from hemorrhoids, however it persisted.  She had her first colonoscopy ever in September which showed a sigmoid/rectal mass w/bx showing adenocarcinoma.  6 polyps were also removed during the procedure. Metastatic workup was negative as per notes.  10/10 she was admitted for LAR.  She reports that since yesterday she has thought she was going to have a BM 7 times but has just passed blood and clots.   She otherwise feels fine - no SOB, dizziness, lightheadedness.  Some abdominal pain at surgical site that she says is tolerable.    ROS: 12 pt ROS performed, neg except as per HPI.    PMH:  Basal cell carcinoma of forehead   otitis bilateral  Hypertension  Malignant neoplasm of descending colon   Other hyperlipidemia.   Afib    PSH:   History of right cataract extraction   History of surgery basal cell removal from forehead  History of surgery Cardiac catherization - negative    SH:  Former smoker-- quit 30 years ago  Retired .  Lives alone.    Allergies: none    Home Medications:   * Patient Currently Takes Medications as of 10-Oct-2019 11:26 documented in Structured Notes  · 	enalapril 10 mg oral tablet: Last Dose Taken: 10-Oct-2019 AM, 1 tab(s) orally once a day  · 	atenolol 50 mg oral tablet: Last Dose Taken: 10-Oct-2019 AM, 1 tab(s) orally once a day  · 	simvastatin 40 mg oral tablet: Last Dose Taken: 09-Oct-2019 PM, 1 tab(s) orally once a day (at bedtime)  · 	Aspirin Enteric Coated 81 mg oral delayed release tablet: Last Dose Taken: 04-Oct-2019 , 1 tab(s) orally once a day    MEDICATIONS  (STANDING):  acetaminophen   Tablet .. 650 milliGRAM(s) Oral every 6 hours  alvimopan 12 milliGRAM(s) Oral two times a day  ATENolol  Tablet 50 milliGRAM(s) Oral daily  BUpivacaine liposome 1.3% Injectable (no eMAR) 20 milliLiter(s) Local Injection once  enalapril 20 milliGRAM(s) Oral daily  gabapentin 300 milliGRAM(s) Oral every 6 hours  lactated ringers. 1000 milliLiter(s) (50 mL/Hr) IV Continuous <Continuous>  magnesium sulfate  IVPB 2 Gram(s) IV Intermittent every 6 hours  potassium chloride   Powder 40 milliEquivalent(s) Oral every 2 hours    MEDICATIONS  (PRN):  ondansetron Injectable 4 milliGRAM(s) IV Push every 6 hours PRN Nausea    Exam:  Vital Signs Last 24 Hrs  T(C): 36.9 (15 Oct 2019 09:06), Max: 37.6 (14 Oct 2019 14:06)  T(F): 98.5 (15 Oct 2019 09:06), Max: 99.6 (14 Oct 2019 14:06)  HR: 82 (15 Oct 2019 08:17) (82 - 96)  BP: 160/69 (15 Oct 2019 08:17) (146/65 - 179/72)  BP(mean): 99 (15 Oct 2019 08:17) (94 - 104)  RR: 16 (15 Oct 2019 08:17) (16 - 18)  SpO2: 97% (15 Oct 2019 08:17) (94% - 97%)    Gen: well appearing, NAD   HEENT: MMM  Card: rrr, s1/s2, no mrg  Lungs: ctab  Abd: +BS, soft, diffusely with mild tenderness but no rebound or guarding  LE: no edema  Neuro: aaox3, moving all extremities    Labs:                          8.6    8.43  )-----------( 174      ( 15 Oct 2019 05:55 )             26.2     10-15    133<L>  |  98  |  6<L>  ----------------------------<  93  3.0<L>   |  23  |  0.39<L>    Ca    7.8<L>      15 Oct 2019 05:55  Phos  3.3     10-15  Mg     1.4     10-15        Path:  Descending colon polyp, polypectomy ([id number]):  - Fragments of hyperplastic polyp.    Descending colon polyp, polypectomy ([id number]):  - Tubular adenoma.    Descending colon polyp, polypectomy ([id number]):  - Tubulovillous adenoma.    Descending colon polyp, polypectomy ([id number]):  - Tubular adenoma.    Descending colon polyp, polypectomy ([id number]):  - Tubular adenoma.    Descending colon polyp, polypectomy ([id number]):  - Tubulovillous adenoma.    Descending colon mass, biopsy ([id number]):  - Superficial fragments of colonic mucosa with the least  intramucosal adenocarcinoma.

## 2019-10-16 ENCOUNTER — TRANSCRIPTION ENCOUNTER (OUTPATIENT)
Age: 74
End: 2019-10-16

## 2019-10-16 LAB
ANION GAP SERPL CALC-SCNC: 12 MMOL/L — SIGNIFICANT CHANGE UP (ref 5–17)
BUN SERPL-MCNC: 5 MG/DL — LOW (ref 7–23)
CALCIUM SERPL-MCNC: 7.7 MG/DL — LOW (ref 8.4–10.5)
CHLORIDE SERPL-SCNC: 97 MMOL/L — SIGNIFICANT CHANGE UP (ref 96–108)
CO2 SERPL-SCNC: 20 MMOL/L — LOW (ref 22–31)
CREAT SERPL-MCNC: 0.36 MG/DL — LOW (ref 0.5–1.3)
GLUCOSE SERPL-MCNC: 100 MG/DL — HIGH (ref 70–99)
HCT VFR BLD CALC: 27.2 % — LOW (ref 34.5–45)
HGB BLD-MCNC: 8.7 G/DL — LOW (ref 11.5–15.5)
MAGNESIUM SERPL-MCNC: 2.2 MG/DL — SIGNIFICANT CHANGE UP (ref 1.6–2.6)
MCHC RBC-ENTMCNC: 29.9 PG — SIGNIFICANT CHANGE UP (ref 27–34)
MCHC RBC-ENTMCNC: 32 GM/DL — SIGNIFICANT CHANGE UP (ref 32–36)
MCV RBC AUTO: 93.5 FL — SIGNIFICANT CHANGE UP (ref 80–100)
NRBC # BLD: 0 /100 WBCS — SIGNIFICANT CHANGE UP (ref 0–0)
PHOSPHATE SERPL-MCNC: 2.7 MG/DL — SIGNIFICANT CHANGE UP (ref 2.5–4.5)
PLATELET # BLD AUTO: 171 K/UL — SIGNIFICANT CHANGE UP (ref 150–400)
POTASSIUM SERPL-MCNC: 3.3 MMOL/L — LOW (ref 3.5–5.3)
POTASSIUM SERPL-SCNC: 3.3 MMOL/L — LOW (ref 3.5–5.3)
RBC # BLD: 2.91 M/UL — LOW (ref 3.8–5.2)
RBC # FLD: 14.5 % — SIGNIFICANT CHANGE UP (ref 10.3–14.5)
SODIUM SERPL-SCNC: 129 MMOL/L — LOW (ref 135–145)
WBC # BLD: 9.94 K/UL — SIGNIFICANT CHANGE UP (ref 3.8–10.5)
WBC # FLD AUTO: 9.94 K/UL — SIGNIFICANT CHANGE UP (ref 3.8–10.5)

## 2019-10-16 RX ORDER — POTASSIUM CHLORIDE 20 MEQ
10 PACKET (EA) ORAL
Refills: 0 | Status: COMPLETED | OUTPATIENT
Start: 2019-10-16 | End: 2019-10-16

## 2019-10-16 RX ORDER — HEPARIN SODIUM 5000 [USP'U]/ML
5000 INJECTION INTRAVENOUS; SUBCUTANEOUS EVERY 8 HOURS
Refills: 0 | Status: DISCONTINUED | OUTPATIENT
Start: 2019-10-16 | End: 2019-10-17

## 2019-10-16 RX ADMIN — HEPARIN SODIUM 5000 UNIT(S): 5000 INJECTION INTRAVENOUS; SUBCUTANEOUS at 13:43

## 2019-10-16 RX ADMIN — Medication 20 MILLIGRAM(S): at 07:27

## 2019-10-16 RX ADMIN — Medication 100 MILLIEQUIVALENT(S): at 11:43

## 2019-10-16 RX ADMIN — Medication 100 MILLIEQUIVALENT(S): at 10:16

## 2019-10-16 RX ADMIN — Medication 650 MILLIGRAM(S): at 13:29

## 2019-10-16 RX ADMIN — Medication 650 MILLIGRAM(S): at 08:20

## 2019-10-16 RX ADMIN — Medication 650 MILLIGRAM(S): at 12:31

## 2019-10-16 RX ADMIN — ATENOLOL 50 MILLIGRAM(S): 25 TABLET ORAL at 07:27

## 2019-10-16 RX ADMIN — Medication 650 MILLIGRAM(S): at 18:17

## 2019-10-16 RX ADMIN — Medication 650 MILLIGRAM(S): at 23:08

## 2019-10-16 RX ADMIN — ALVIMOPAN 12 MILLIGRAM(S): 12 CAPSULE ORAL at 10:21

## 2019-10-16 RX ADMIN — AMLODIPINE BESYLATE 5 MILLIGRAM(S): 2.5 TABLET ORAL at 07:27

## 2019-10-16 RX ADMIN — Medication 100 MILLIEQUIVALENT(S): at 13:43

## 2019-10-16 RX ADMIN — ALVIMOPAN 12 MILLIGRAM(S): 12 CAPSULE ORAL at 23:08

## 2019-10-16 RX ADMIN — Medication 50 GRAM(S): at 03:33

## 2019-10-16 RX ADMIN — Medication 650 MILLIGRAM(S): at 23:40

## 2019-10-16 RX ADMIN — Medication 650 MILLIGRAM(S): at 07:26

## 2019-10-16 RX ADMIN — HEPARIN SODIUM 5000 UNIT(S): 5000 INJECTION INTRAVENOUS; SUBCUTANEOUS at 23:08

## 2019-10-16 RX ADMIN — Medication 650 MILLIGRAM(S): at 18:23

## 2019-10-16 NOTE — PROGRESS NOTE ADULT - SUBJECTIVE AND OBJECTIVE BOX
STATUS POST:       SUBJECTIVE: Patient seen and examined bedside with chief resident.  Pain controlled. Diet tolerated but minimal appetite. Did not ambulate yesterday +F/+BM, no nausea or vomiting. No complaints at this time.      amLODIPine   Tablet 5 milliGRAM(s) Oral daily  ATENolol  Tablet 50 milliGRAM(s) Oral daily  enalapril 20 milliGRAM(s) Oral daily  labetalol Injectable 5 milliGRAM(s) IV Push every 2 hours PRN      Vital Signs Last 24 Hrs  T(C): 36.6 (16 Oct 2019 04:39), Max: 36.9 (15 Oct 2019 09:06)  T(F): 97.9 (16 Oct 2019 04:39), Max: 98.5 (15 Oct 2019 09:06)  HR: 92 (16 Oct 2019 05:00) (82 - 104)  BP: 149/65 (16 Oct 2019 05:00) (146/64 - 216/89)  BP(mean): 94 (16 Oct 2019 05:00) (92 - 132)  RR: 18 (16 Oct 2019 05:00) (16 - 18)  SpO2: 95% (16 Oct 2019 05:00) (94% - 97%)  I&O's Detail    15 Oct 2019 07:01  -  16 Oct 2019 07:00  --------------------------------------------------------  IN:    IV PiggyBack: 100 mL    lactated ringers.: 1050 mL  Total IN: 1150 mL    OUT:    Voided: 275 mL  Total OUT: 275 mL    Total NET: 875 mL    General: NAD, resting comfortably in bed  C/V: NSR  Pulm: Nonlabored breathing, no respiratory distress  Abd: soft, midline incision with staples c/d/i  Extrem: WWP, no edema, SCDs in place      LABS:                        8.7    9.94  )-----------( 171      ( 16 Oct 2019 06:25 )             27.2     10-15    134<L>  |  99  |  5<L>  ----------------------------<  133<H>  3.6   |  23  |  0.37<L>    Ca    8.0<L>      15 Oct 2019 18:42  Phos  2.8     10-15  Mg     1.5     10-15    73 yo F hx HTN, HLD with sigmoid mass here for resection. Postop course c/b BRBPR s/p RTOR for flex sig and dx laparoscopy with no active bleeding found, H/H now stable    - pain/nausea control PRN  - SCDs/OOB  - ISS  - LFD  - Monitor H/H  - AM labs

## 2019-10-16 NOTE — PROGRESS NOTE ADULT - SUBJECTIVE AND OBJECTIVE BOX
Interval history:  Feeling better today.  BMs all brown, no further blood.  Feels like she needs more PT - concerned about going home since she lives alone.    HPI: 75 yo F with hx of HTN, HLD and hemorrhoids who developed BRBPR over the summer which she thought was from hemorrhoids, however it persisted.  She had her first colonoscopy ever in September which showed a sigmoid/rectal mass w/bx showing adenocarcinoma.  6 polyps were also removed during the procedure. Metastatic workup was negative as per notes.  10/10 she was admitted for LAR.  She reports that since yesterday she has thought she was going to have a BM 7 times but has just passed blood and clots.   She otherwise feels fine - no SOB, dizziness, lightheadedness.  Some abdominal pain at surgical site that she says is tolerable.    ROS: 12 pt ROS performed, neg except as per HPI.    PMH:  Basal cell carcinoma of forehead   otitis bilateral  Hypertension  Malignant neoplasm of descending colon   Other hyperlipidemia.   Afib    PSH:   History of right cataract extraction   History of surgery basal cell removal from forehead  History of surgery Cardiac catherization - negative    SH:  Former smoker-- quit 30 years ago  Retired .  Lives alone.    Allergies: none    Home Medications:   * Patient Currently Takes Medications as of 10-Oct-2019 11:26 documented in Structured Notes  · 	enalapril 10 mg oral tablet: Last Dose Taken: 10-Oct-2019 AM, 1 tab(s) orally once a day  · 	atenolol 50 mg oral tablet: Last Dose Taken: 10-Oct-2019 AM, 1 tab(s) orally once a day  · 	simvastatin 40 mg oral tablet: Last Dose Taken: 09-Oct-2019 PM, 1 tab(s) orally once a day (at bedtime)  · 	Aspirin Enteric Coated 81 mg oral delayed release tablet: Last Dose Taken: 04-Oct-2019 , 1 tab(s) orally once a day    MEDICATIONS  (STANDING):  acetaminophen   Tablet .. 650 milliGRAM(s) Oral every 6 hours  alvimopan 12 milliGRAM(s) Oral two times a day  ATENolol  Tablet 50 milliGRAM(s) Oral daily  BUpivacaine liposome 1.3% Injectable (no eMAR) 20 milliLiter(s) Local Injection once  enalapril 20 milliGRAM(s) Oral daily  gabapentin 300 milliGRAM(s) Oral every 6 hours  lactated ringers. 1000 milliLiter(s) (50 mL/Hr) IV Continuous <Continuous>  magnesium sulfate  IVPB 2 Gram(s) IV Intermittent every 6 hours  potassium chloride   Powder 40 milliEquivalent(s) Oral every 2 hours    MEDICATIONS  (PRN):  ondansetron Injectable 4 milliGRAM(s) IV Push every 6 hours PRN Nausea    Exam:  Vital Signs Last 24 Hrs  T(C): 36.6 (16 Oct 2019 04:39), Max: 36.9 (15 Oct 2019 09:06)  T(F): 97.9 (16 Oct 2019 04:39), Max: 98.5 (15 Oct 2019 09:06)  HR: 88 (16 Oct 2019 07:24) (88 - 104)  BP: 165/68 (16 Oct 2019 07:24) (146/64 - 216/89)  BP(mean): 98 (16 Oct 2019 07:24) (92 - 132)  RR: 18 (16 Oct 2019 07:24) (16 - 18)  SpO2: 95% (16 Oct 2019 07:24) (94% - 97%)      Gen: well appearing, NAD   HEENT: MMM  Card: rrr, s1/s2, no mrg  Lungs: ctab  Abd: +BS, soft, diffusely with mild tenderness but no rebound or guarding  LE: no edema  Neuro: aaox3, moving all extremities    Labs:                          8.6    8.43  )-----------( 174      ( 15 Oct 2019 05:55 )             26.2     10-15    133<L>  |  98  |  6<L>  ----------------------------<  93  3.0<L>   |  23  |  0.39<L>    Ca    7.8<L>      15 Oct 2019 05:55  Phos  3.3     10-15  Mg     1.4     10-15        Path:  Descending colon polyp, polypectomy ([id number]):  - Fragments of hyperplastic polyp.    Descending colon polyp, polypectomy ([id number]):  - Tubular adenoma.    Descending colon polyp, polypectomy ([id number]):  - Tubulovillous adenoma.    Descending colon polyp, polypectomy ([id number]):  - Tubular adenoma.    Descending colon polyp, polypectomy ([id number]):  - Tubular adenoma.    Descending colon polyp, polypectomy ([id number]):  - Tubulovillous adenoma.    Descending colon mass, biopsy ([id number]):  - Superficial fragments of colonic mucosa with the least  intramucosal adenocarcinoma.

## 2019-10-16 NOTE — DISCHARGE NOTE PROVIDER - CARE PROVIDER_API CALL
Laureano Parks)  ColonRectal Surgery; Surgery  [address], [address]  [place] [address]  Phone: [phone]  Fax: [phone]  Follow Up Time:

## 2019-10-16 NOTE — DISCHARGE NOTE PROVIDER - HOSPITAL COURSE
75 y/o F with hx of HTN, HLD, and hemorrhoids who had some BPR (which she attributed to hemorrhoids) this summer which prompted her to receive her first colonoscopy in September, which showed a sigmoid/rectal mass. Metastatic workup was negative. She tolerated prep. She was admitted for elective resection and underwent low anterior resection operation on October 10. Her postoperative course was complicated by 3 episodes of bright red blood per rectum. She remained hemodynamically stable and her hemoglobin/hematocrit levels were trended. She was transfused 2 units of PRBCs and on October 12 she returned to the operating room for flexible sigmoidoscopy and diagnostic laparoscopy, both of which did not reveal active bleeding. Moving forward through her hospital course, she had several bloody bowel movements but hemoglobin levels remained stable and her stools then became non-bloody. Her postop course was further complicated by hypertension, tachycardia, and an episode of A-fib for which cardiology was consulted. Home meds were reinstated and ultimately adjusted to control blood pressures. An ECHO was also obtained which showed EF 60%. She was cleared by cardiology to follow-up outpatient. Her hospital course was further complicated by low urine output which was monitored initially via fitzpatrick catheter. The fitzpatrick catheter was removed when she was determined to be making adequate urine and her urine output continued to be monitored. She was seen by physical therapy and deemed appropriate for short term rehab. Diet was advanced as tolerated and pain was well controlled on medication. On day of discharge, pt deemed stable and ready to return to rehab with plans to follow up as an outpatient with surgery and cardiology. 73 y/o F with hx of HTN, HLD, and hemorrhoids who had some BPR (which she attributed to hemorrhoids) this summer which prompted her to receive her first colonoscopy in September, which showed a sigmoid/rectal mass. Metastatic workup was negative. She tolerated prep. She was admitted for elective resection and underwent low anterior resection operation on October 10. Her postoperative course was complicated by 3 episodes of bright red blood per rectum. She remained hemodynamically stable and her hemoglobin/hematocrit levels were trended. She was transfused 2 units of PRBCs and on October 12 she returned to the operating room for flexible sigmoidoscopy and diagnostic laparoscopy, both of which did not reveal active bleeding. Moving forward through her hospital course, she had several bloody bowel movements but hemoglobin levels remained stable and her stools then became non-bloody. Her postop course was further complicated by hypertension, tachycardia, and an episode of A-fib for which cardiology was consulted. Home meds were reinstated and ultimately adjusted to control blood pressures. An ECHO was also obtained which showed EF 60%. She was cleared by cardiology to follow-up outpatient for reconciliation of blood pressure medication, NM pharmacological stress test, and discussion of starting eliquis for A-fib. Her hospital course was further complicated by low urine output which was monitored initially via fitzpatrick catheter. The fitzpatrick catheter was removed when she was determined to be making adequate urine and her urine output continued to be monitored. She was seen by physical therapy and deemed appropriate for short term rehab. Diet was advanced as tolerated and pain was well controlled on medication. On day of discharge, pt deemed stable and ready to return to rehab with plans to follow up as an outpatient with surgery, cardiology, and primary care physician.

## 2019-10-16 NOTE — PROGRESS NOTE ADULT - ASSESSMENT
75 yo F with hx of HTN, HLD hemorrhoids, and newly dx'd sigmoid adeno s/p LAR with worsening anemia while passing blood per rectum.  Now improved, Hb stable.  -no further bloody bowel movements.   -monitor CBC q12 hrs and transfuse to goal Hb of 7. Hgb improving- 8.7 today.  -Final surg path- T3/N0 (0/28 lymph nodes)- Stage IIA. Awaiting MMR/MSI. To review pathology in full detail with Dr. Ordonez in outpt.  -Afib. Now on beta-blocker. Holding off on starting anticoagulation give hgb instability.     Thank you for allowing me to participate in her care.  Please feel free to call w/any questions.    Gary Hansen for Nicole Ordonez MD  [facility] Onc Attending  [phone]

## 2019-10-16 NOTE — DISCHARGE NOTE PROVIDER - NSDCFUADDAPPT_GEN_ALL_CORE_FT
Please follow up with your cardiologist for continued management of blood pressure, NM pharmacological stress test, and discussion of beginning eliquis for A-fib.

## 2019-10-17 ENCOUNTER — TRANSCRIPTION ENCOUNTER (OUTPATIENT)
Age: 74
End: 2019-10-17

## 2019-10-17 VITALS
TEMPERATURE: 98 F | OXYGEN SATURATION: 96 % | RESPIRATION RATE: 17 BRPM | SYSTOLIC BLOOD PRESSURE: 148 MMHG | HEART RATE: 89 BPM | DIASTOLIC BLOOD PRESSURE: 70 MMHG

## 2019-10-17 LAB
ANION GAP SERPL CALC-SCNC: 10 MMOL/L — SIGNIFICANT CHANGE UP (ref 5–17)
BUN SERPL-MCNC: 5 MG/DL — LOW (ref 7–23)
CALCIUM SERPL-MCNC: 7.6 MG/DL — LOW (ref 8.4–10.5)
CHLORIDE SERPL-SCNC: 98 MMOL/L — SIGNIFICANT CHANGE UP (ref 96–108)
CO2 SERPL-SCNC: 24 MMOL/L — SIGNIFICANT CHANGE UP (ref 22–31)
CREAT SERPL-MCNC: 0.37 MG/DL — LOW (ref 0.5–1.3)
GLUCOSE SERPL-MCNC: 92 MG/DL — SIGNIFICANT CHANGE UP (ref 70–99)
HCT VFR BLD CALC: 25.8 % — LOW (ref 34.5–45)
HGB BLD-MCNC: 8.6 G/DL — LOW (ref 11.5–15.5)
MAGNESIUM SERPL-MCNC: 1.5 MG/DL — LOW (ref 1.6–2.6)
MCHC RBC-ENTMCNC: 30 PG — SIGNIFICANT CHANGE UP (ref 27–34)
MCHC RBC-ENTMCNC: 33.3 GM/DL — SIGNIFICANT CHANGE UP (ref 32–36)
MCV RBC AUTO: 89.9 FL — SIGNIFICANT CHANGE UP (ref 80–100)
NRBC # BLD: 0 /100 WBCS — SIGNIFICANT CHANGE UP (ref 0–0)
PHOSPHATE SERPL-MCNC: 3.4 MG/DL — SIGNIFICANT CHANGE UP (ref 2.5–4.5)
PLATELET # BLD AUTO: 203 K/UL — SIGNIFICANT CHANGE UP (ref 150–400)
POTASSIUM SERPL-MCNC: 3.2 MMOL/L — LOW (ref 3.5–5.3)
POTASSIUM SERPL-SCNC: 3.2 MMOL/L — LOW (ref 3.5–5.3)
RBC # BLD: 2.87 M/UL — LOW (ref 3.8–5.2)
RBC # FLD: 14.2 % — SIGNIFICANT CHANGE UP (ref 10.3–14.5)
SODIUM SERPL-SCNC: 132 MMOL/L — LOW (ref 135–145)
WBC # BLD: 7.42 K/UL — SIGNIFICANT CHANGE UP (ref 3.8–10.5)
WBC # FLD AUTO: 7.42 K/UL — SIGNIFICANT CHANGE UP (ref 3.8–10.5)

## 2019-10-17 RX ORDER — ATENOLOL 25 MG/1
1 TABLET ORAL
Qty: 0 | Refills: 0 | DISCHARGE

## 2019-10-17 RX ORDER — ATENOLOL 25 MG/1
1 TABLET ORAL
Qty: 0 | Refills: 0 | DISCHARGE
Start: 2019-10-17

## 2019-10-17 RX ORDER — AMLODIPINE BESYLATE 2.5 MG/1
1 TABLET ORAL
Qty: 0 | Refills: 0 | DISCHARGE
Start: 2019-10-17

## 2019-10-17 RX ORDER — ACETAMINOPHEN 500 MG
2 TABLET ORAL
Qty: 0 | Refills: 0 | DISCHARGE
Start: 2019-10-17

## 2019-10-17 RX ORDER — ASPIRIN/CALCIUM CARB/MAGNESIUM 324 MG
1 TABLET ORAL
Qty: 0 | Refills: 0 | DISCHARGE

## 2019-10-17 RX ORDER — POTASSIUM CHLORIDE 20 MEQ
10 PACKET (EA) ORAL
Refills: 0 | Status: COMPLETED | OUTPATIENT
Start: 2019-10-17 | End: 2019-10-17

## 2019-10-17 RX ADMIN — Medication 650 MILLIGRAM(S): at 05:15

## 2019-10-17 RX ADMIN — HEPARIN SODIUM 5000 UNIT(S): 5000 INJECTION INTRAVENOUS; SUBCUTANEOUS at 05:15

## 2019-10-17 RX ADMIN — Medication 100 MILLIEQUIVALENT(S): at 09:39

## 2019-10-17 RX ADMIN — HEPARIN SODIUM 5000 UNIT(S): 5000 INJECTION INTRAVENOUS; SUBCUTANEOUS at 14:30

## 2019-10-17 RX ADMIN — AMLODIPINE BESYLATE 5 MILLIGRAM(S): 2.5 TABLET ORAL at 05:15

## 2019-10-17 RX ADMIN — Medication 100 MILLIEQUIVALENT(S): at 08:01

## 2019-10-17 RX ADMIN — ALVIMOPAN 12 MILLIGRAM(S): 12 CAPSULE ORAL at 12:19

## 2019-10-17 RX ADMIN — Medication 650 MILLIGRAM(S): at 12:18

## 2019-10-17 RX ADMIN — Medication 650 MILLIGRAM(S): at 05:45

## 2019-10-17 RX ADMIN — Medication 20 MILLIGRAM(S): at 05:15

## 2019-10-17 RX ADMIN — Medication 100 MILLIEQUIVALENT(S): at 12:24

## 2019-10-17 RX ADMIN — ATENOLOL 50 MILLIGRAM(S): 25 TABLET ORAL at 05:15

## 2019-10-17 NOTE — PROGRESS NOTE ADULT - ASSESSMENT
73 yo F hx HTN, HLD with sigmoid mass here for resection. Postop course c/b BRBPR s/p RTOR for flex sig and dx laparoscopy with no active bleeding found, H/H now stable    - pain/nausea control PRN  - SCDs/OOB  - ISS  - LRD  - Monitor H/H  - AM labs 73 yo F hx HTN, HLD with sigmoid mass here for resection. Postop course c/b BRBPR s/p RTOR for flex sig and dx laparoscopy with no active bleeding found, H/H now stable    - pain/nausea control PRN  - alvimopan on board per protocol to prevent postop ileus (patient will not be discharged on this medication)  - SCDs/OOB  - ISS  - LRD  - Monitor H/H  - AM labs

## 2019-10-17 NOTE — DISCHARGE NOTE NURSING/CASE MANAGEMENT/SOCIAL WORK - PATIENT PORTAL LINK FT
You can access the FollowMyHealth Patient Portal offered by [facility] by registering at the following website: [email or web address]. By joining [facility]’s FollowMyHealth portal, you will also be able to view your health information using other applications (apps) compatible with our system.

## 2019-10-17 NOTE — PROGRESS NOTE ADULT - REASON FOR ADMISSION
Colorectal Cancer

## 2019-10-17 NOTE — PROGRESS NOTE ADULT - PROVIDER SPECIALTY LIST ADULT
Cardiology
Cardiology
Heme/Onc
Surgery

## 2019-10-17 NOTE — PROGRESS NOTE ADULT - SUBJECTIVE AND OBJECTIVE BOX
24 hr events:  ON: JILL  10/16: started SQH, tolerating diet, not really ambulating, ANNA pending insurance auth, stepped down    SUBJECTIVE:  Pt seen and examined by chief resident. Pt is doing well, resting comfortably on bed. Pain controlled. Diet tolerated. Ambulating with PT. +F/+BM. No nausea or vomiting. No complaints at this time.    Vital Signs Last 24 Hrs  T(C): 36.8 (17 Oct 2019 08:23), Max: 36.9 (16 Oct 2019 14:10)  T(F): 98.3 (17 Oct 2019 08:23), Max: 98.5 (17 Oct 2019 05:37)  HR: 87 (17 Oct 2019 08:23) (72 - 90)  BP: 146/68 (17 Oct 2019 08:23) (123/57 - 146/68)  BP(mean): 85 (16 Oct 2019 18:24) (78 - 85)  RR: 18 (17 Oct 2019 08:23) (14 - 18)  SpO2: 96% (17 Oct 2019 08:23) (95% - 98%)    Physical Exam:  General: NAD  Pulmonary: Nonlabored breathing, no respiratory distress  Abdominal: soft, NT/ND, midline incision with stapes c/d/i  Extremities: WWP, without edema      I&O's Summary    16 Oct 2019 07:01  -  17 Oct 2019 07:00  --------------------------------------------------------  IN: 1150 mL / OUT: 950 mL / NET: 200 mL        LABS:                        8.6    7.42  )-----------( 203      ( 17 Oct 2019 07:15 )             25.8     10-17    132<L>  |  98  |  5<L>  ----------------------------<  92  3.2<L>   |  24  |  0.37<L>    Ca    7.6<L>      17 Oct 2019 07:15  Phos  3.4     10-17  Mg     1.5     10-17

## 2019-10-18 ENCOUNTER — OUTPATIENT (OUTPATIENT)
Dept: OUTPATIENT SERVICES | Facility: HOSPITAL | Age: 74
LOS: 1 days | Discharge: HOME | End: 2019-10-18

## 2019-10-18 DIAGNOSIS — Z98.41 CATARACT EXTRACTION STATUS, RIGHT EYE: Chronic | ICD-10-CM

## 2019-10-18 DIAGNOSIS — Z98.890 OTHER SPECIFIED POSTPROCEDURAL STATES: Chronic | ICD-10-CM

## 2019-10-18 DIAGNOSIS — R79.9 ABNORMAL FINDING OF BLOOD CHEMISTRY, UNSPECIFIED: ICD-10-CM

## 2019-10-24 DIAGNOSIS — D62 ACUTE POSTHEMORRHAGIC ANEMIA: ICD-10-CM

## 2019-10-24 DIAGNOSIS — R18.8 OTHER ASCITES: ICD-10-CM

## 2019-10-24 DIAGNOSIS — I48.91 UNSPECIFIED ATRIAL FIBRILLATION: ICD-10-CM

## 2019-10-24 DIAGNOSIS — Z87.891 PERSONAL HISTORY OF NICOTINE DEPENDENCE: ICD-10-CM

## 2019-10-24 DIAGNOSIS — Z85.828 PERSONAL HISTORY OF OTHER MALIGNANT NEOPLASM OF SKIN: ICD-10-CM

## 2019-10-24 DIAGNOSIS — E44.0 MODERATE PROTEIN-CALORIE MALNUTRITION: ICD-10-CM

## 2019-10-24 DIAGNOSIS — D63.0 ANEMIA IN NEOPLASTIC DISEASE: ICD-10-CM

## 2019-10-24 DIAGNOSIS — E78.5 HYPERLIPIDEMIA, UNSPECIFIED: ICD-10-CM

## 2019-10-24 DIAGNOSIS — I97.89 OTHER POSTPROCEDURAL COMPLICATIONS AND DISORDERS OF THE CIRCULATORY SYSTEM, NOT ELSEWHERE CLASSIFIED: ICD-10-CM

## 2019-10-24 DIAGNOSIS — C19 MALIGNANT NEOPLASM OF RECTOSIGMOID JUNCTION: ICD-10-CM

## 2019-10-24 DIAGNOSIS — I10 ESSENTIAL (PRIMARY) HYPERTENSION: ICD-10-CM

## 2019-10-24 DIAGNOSIS — Z79.82 LONG TERM (CURRENT) USE OF ASPIRIN: ICD-10-CM

## 2019-10-29 PROCEDURE — 80048 BASIC METABOLIC PNL TOTAL CA: CPT

## 2019-10-29 PROCEDURE — 86901 BLOOD TYPING SEROLOGIC RH(D): CPT

## 2019-10-29 PROCEDURE — 88304 TISSUE EXAM BY PATHOLOGIST: CPT

## 2019-10-29 PROCEDURE — 93306 TTE W/DOPPLER COMPLETE: CPT

## 2019-10-29 PROCEDURE — 71045 X-RAY EXAM CHEST 1 VIEW: CPT

## 2019-10-29 PROCEDURE — 83010 ASSAY OF HAPTOGLOBIN QUANT: CPT

## 2019-10-29 PROCEDURE — P9021: CPT

## 2019-10-29 PROCEDURE — 86900 BLOOD TYPING SEROLOGIC ABO: CPT

## 2019-10-29 PROCEDURE — P9035: CPT

## 2019-10-29 PROCEDURE — 97116 GAIT TRAINING THERAPY: CPT

## 2019-10-29 PROCEDURE — 88309 TISSUE EXAM BY PATHOLOGIST: CPT

## 2019-10-29 PROCEDURE — 85730 THROMBOPLASTIN TIME PARTIAL: CPT

## 2019-10-29 PROCEDURE — 36430 TRANSFUSION BLD/BLD COMPNT: CPT

## 2019-10-29 PROCEDURE — 85025 COMPLETE CBC W/AUTO DIFF WBC: CPT

## 2019-10-29 PROCEDURE — 83880 ASSAY OF NATRIURETIC PEPTIDE: CPT

## 2019-10-29 PROCEDURE — 94640 AIRWAY INHALATION TREATMENT: CPT

## 2019-10-29 PROCEDURE — P9016: CPT

## 2019-10-29 PROCEDURE — 97161 PT EVAL LOW COMPLEX 20 MIN: CPT

## 2019-10-29 PROCEDURE — 85610 PROTHROMBIN TIME: CPT

## 2019-10-29 PROCEDURE — 83735 ASSAY OF MAGNESIUM: CPT

## 2019-10-29 PROCEDURE — 86850 RBC ANTIBODY SCREEN: CPT

## 2019-10-29 PROCEDURE — 85027 COMPLETE CBC AUTOMATED: CPT

## 2019-10-29 PROCEDURE — 83550 IRON BINDING TEST: CPT

## 2019-10-29 PROCEDURE — C1889: CPT

## 2019-10-29 PROCEDURE — 84484 ASSAY OF TROPONIN QUANT: CPT

## 2019-10-29 PROCEDURE — 84100 ASSAY OF PHOSPHORUS: CPT

## 2019-10-29 PROCEDURE — 85384 FIBRINOGEN ACTIVITY: CPT

## 2019-10-29 PROCEDURE — 86923 COMPATIBILITY TEST ELECTRIC: CPT

## 2019-10-29 PROCEDURE — 83615 LACTATE (LD) (LDH) ENZYME: CPT

## 2019-10-29 PROCEDURE — 82728 ASSAY OF FERRITIN: CPT

## 2019-10-29 PROCEDURE — 83540 ASSAY OF IRON: CPT

## 2019-10-29 PROCEDURE — 36415 COLL VENOUS BLD VENIPUNCTURE: CPT

## 2020-10-15 ENCOUNTER — INPATIENT (INPATIENT)
Facility: HOSPITAL | Age: 75
LOS: 4 days | Discharge: NURSING, REHAB OR LONG-TERM CARE | End: 2020-10-20
Attending: HOSPITALIST | Admitting: HOSPITALIST
Payer: MEDICARE

## 2020-10-15 VITALS
TEMPERATURE: 98 F | DIASTOLIC BLOOD PRESSURE: 88 MMHG | OXYGEN SATURATION: 95 % | HEART RATE: 67 BPM | RESPIRATION RATE: 20 BRPM | SYSTOLIC BLOOD PRESSURE: 215 MMHG

## 2020-10-15 DIAGNOSIS — Z90.49 ACQUIRED ABSENCE OF OTHER SPECIFIED PARTS OF DIGESTIVE TRACT: Chronic | ICD-10-CM

## 2020-10-15 DIAGNOSIS — Z98.890 OTHER SPECIFIED POSTPROCEDURAL STATES: Chronic | ICD-10-CM

## 2020-10-15 DIAGNOSIS — Z98.41 CATARACT EXTRACTION STATUS, RIGHT EYE: Chronic | ICD-10-CM

## 2020-10-15 LAB
ALBUMIN SERPL ELPH-MCNC: 3.9 G/DL — SIGNIFICANT CHANGE UP (ref 3.5–5.2)
ALP SERPL-CCNC: 80 U/L — SIGNIFICANT CHANGE UP (ref 30–115)
ALT FLD-CCNC: 35 U/L — SIGNIFICANT CHANGE UP (ref 0–41)
ANION GAP SERPL CALC-SCNC: 14 MMOL/L — SIGNIFICANT CHANGE UP (ref 7–14)
APTT BLD: 28.3 SEC — SIGNIFICANT CHANGE UP (ref 27–39.2)
AST SERPL-CCNC: 40 U/L — SIGNIFICANT CHANGE UP (ref 0–41)
BASOPHILS # BLD AUTO: 0.06 K/UL — SIGNIFICANT CHANGE UP (ref 0–0.2)
BASOPHILS NFR BLD AUTO: 0.6 % — SIGNIFICANT CHANGE UP (ref 0–1)
BILIRUB SERPL-MCNC: 0.9 MG/DL — SIGNIFICANT CHANGE UP (ref 0.2–1.2)
BLD GP AB SCN SERPL QL: SIGNIFICANT CHANGE UP
BUN SERPL-MCNC: 6 MG/DL — LOW (ref 10–20)
CALCIUM SERPL-MCNC: 9 MG/DL — SIGNIFICANT CHANGE UP (ref 8.5–10.1)
CHLORIDE SERPL-SCNC: 89 MMOL/L — LOW (ref 98–110)
CO2 SERPL-SCNC: 25 MMOL/L — SIGNIFICANT CHANGE UP (ref 17–32)
CREAT SERPL-MCNC: 0.6 MG/DL — LOW (ref 0.7–1.5)
EOSINOPHIL # BLD AUTO: 0.04 K/UL — SIGNIFICANT CHANGE UP (ref 0–0.7)
EOSINOPHIL NFR BLD AUTO: 0.4 % — SIGNIFICANT CHANGE UP (ref 0–8)
GLUCOSE SERPL-MCNC: 106 MG/DL — HIGH (ref 70–99)
HCT VFR BLD CALC: 34.8 % — LOW (ref 37–47)
HGB BLD-MCNC: 12 G/DL — SIGNIFICANT CHANGE UP (ref 12–16)
IMM GRANULOCYTES NFR BLD AUTO: 0.4 % — HIGH (ref 0.1–0.3)
INR BLD: 1.03 RATIO — SIGNIFICANT CHANGE UP (ref 0.65–1.3)
LYMPHOCYTES # BLD AUTO: 2.28 K/UL — SIGNIFICANT CHANGE UP (ref 1.2–3.4)
LYMPHOCYTES # BLD AUTO: 24.4 % — SIGNIFICANT CHANGE UP (ref 20.5–51.1)
MCHC RBC-ENTMCNC: 30.8 PG — SIGNIFICANT CHANGE UP (ref 27–31)
MCHC RBC-ENTMCNC: 34.5 G/DL — SIGNIFICANT CHANGE UP (ref 32–37)
MCV RBC AUTO: 89.2 FL — SIGNIFICANT CHANGE UP (ref 81–99)
MONOCYTES # BLD AUTO: 0.83 K/UL — HIGH (ref 0.1–0.6)
MONOCYTES NFR BLD AUTO: 8.9 % — SIGNIFICANT CHANGE UP (ref 1.7–9.3)
NEUTROPHILS # BLD AUTO: 6.1 K/UL — SIGNIFICANT CHANGE UP (ref 1.4–6.5)
NEUTROPHILS NFR BLD AUTO: 65.3 % — SIGNIFICANT CHANGE UP (ref 42.2–75.2)
NRBC # BLD: 0 /100 WBCS — SIGNIFICANT CHANGE UP (ref 0–0)
PLATELET # BLD AUTO: 168 K/UL — SIGNIFICANT CHANGE UP (ref 130–400)
POTASSIUM SERPL-MCNC: 4 MMOL/L — SIGNIFICANT CHANGE UP (ref 3.5–5)
POTASSIUM SERPL-SCNC: 4 MMOL/L — SIGNIFICANT CHANGE UP (ref 3.5–5)
PROT SERPL-MCNC: 6.6 G/DL — SIGNIFICANT CHANGE UP (ref 6–8)
PROTHROM AB SERPL-ACNC: 11.8 SEC — SIGNIFICANT CHANGE UP (ref 9.95–12.87)
RAPID RVP RESULT: SIGNIFICANT CHANGE UP
RBC # BLD: 3.9 M/UL — LOW (ref 4.2–5.4)
RBC # FLD: 12.4 % — SIGNIFICANT CHANGE UP (ref 11.5–14.5)
SARS-COV-2 RNA SPEC QL NAA+PROBE: SIGNIFICANT CHANGE UP
SODIUM SERPL-SCNC: 128 MMOL/L — LOW (ref 135–146)
WBC # BLD: 9.35 K/UL — SIGNIFICANT CHANGE UP (ref 4.8–10.8)
WBC # FLD AUTO: 9.35 K/UL — SIGNIFICANT CHANGE UP (ref 4.8–10.8)

## 2020-10-15 PROCEDURE — 73562 X-RAY EXAM OF KNEE 3: CPT | Mod: 26,LT

## 2020-10-15 PROCEDURE — 93010 ELECTROCARDIOGRAM REPORT: CPT

## 2020-10-15 PROCEDURE — 73700 CT LOWER EXTREMITY W/O DYE: CPT | Mod: 26,LT

## 2020-10-15 PROCEDURE — 73552 X-RAY EXAM OF FEMUR 2/>: CPT | Mod: 26,LT

## 2020-10-15 PROCEDURE — 71045 X-RAY EXAM CHEST 1 VIEW: CPT | Mod: 26

## 2020-10-15 PROCEDURE — 73522 X-RAY EXAM HIPS BI 3-4 VIEWS: CPT | Mod: 26

## 2020-10-15 RX ORDER — ATENOLOL 25 MG/1
50 TABLET ORAL DAILY
Refills: 0 | Status: DISCONTINUED | OUTPATIENT
Start: 2020-10-15 | End: 2020-10-16

## 2020-10-15 RX ORDER — ATORVASTATIN CALCIUM 80 MG/1
20 TABLET, FILM COATED ORAL AT BEDTIME
Refills: 0 | Status: DISCONTINUED | OUTPATIENT
Start: 2020-10-15 | End: 2020-10-16

## 2020-10-15 RX ORDER — ENOXAPARIN SODIUM 100 MG/ML
80 INJECTION SUBCUTANEOUS AT BEDTIME
Refills: 0 | Status: DISCONTINUED | OUTPATIENT
Start: 2020-10-15 | End: 2020-10-15

## 2020-10-15 RX ORDER — ENOXAPARIN SODIUM 100 MG/ML
40 INJECTION SUBCUTANEOUS AT BEDTIME
Refills: 0 | Status: DISCONTINUED | OUTPATIENT
Start: 2020-10-15 | End: 2020-10-16

## 2020-10-15 RX ORDER — SODIUM CHLORIDE 9 MG/ML
1000 INJECTION INTRAMUSCULAR; INTRAVENOUS; SUBCUTANEOUS
Refills: 0 | Status: DISCONTINUED | OUTPATIENT
Start: 2020-10-15 | End: 2020-10-16

## 2020-10-15 RX ORDER — CHLORHEXIDINE GLUCONATE 213 G/1000ML
1 SOLUTION TOPICAL
Refills: 0 | Status: DISCONTINUED | OUTPATIENT
Start: 2020-10-15 | End: 2020-10-16

## 2020-10-15 RX ORDER — ASPIRIN/CALCIUM CARB/MAGNESIUM 324 MG
81 TABLET ORAL DAILY
Refills: 0 | Status: DISCONTINUED | OUTPATIENT
Start: 2020-10-15 | End: 2020-10-16

## 2020-10-15 RX ADMIN — ATORVASTATIN CALCIUM 20 MILLIGRAM(S): 80 TABLET, FILM COATED ORAL at 21:55

## 2020-10-15 RX ADMIN — SODIUM CHLORIDE 100 MILLILITER(S): 9 INJECTION INTRAMUSCULAR; INTRAVENOUS; SUBCUTANEOUS at 21:55

## 2020-10-15 NOTE — ED ADULT NURSE NOTE - NSIMPLEMENTINTERV_GEN_ALL_ED
Implemented All Fall with Harm Risk Interventions:  [place] to call system. Call bell, personal items and telephone within reach. Instruct patient to call for assistance. Room bathroom lighting operational. Non-slip footwear when patient is off stretcher. Physically safe environment: no spills, clutter or unnecessary equipment. Stretcher in lowest position, wheels locked, appropriate side rails in place. Provide visual cue, wrist band, yellow gown, etc. Monitor gait and stability. Monitor for mental status changes and reorient to person, place, and time. Review medications for side effects contributing to fall risk. Reinforce activity limits and safety measures with patient and family. Provide visual clues: red socks.

## 2020-10-15 NOTE — CONSULT NOTE ADULT - SUBJECTIVE AND OBJECTIVE BOX
ORTHO CONSULT NOTE    RUBEN RICE  [id number]    Patient is a 75y year old Female presents to ED after transfer from [facility] after left hip phill and inability to ambulate following fall Tuesday She had mechanical fall from height and was unable to bear weight on left leg. She ambulates unassisted at home and with cane outside. Denies prior hip pain. Denies f/c/cp/sob.    PMH/PSH:    Colon cancer    Arterial stenosis    High blood cholesterol    Hypertension    Hip fracture    History of colon resection    Basal cell ca s/p resection    SH: denies toxic habits; lives with son  Medications:  Reviewed per chart  Allergies: No Known Allergies      Vitals:  T(C): 36.8 (10-15-20 @ 19:33), Max: 36.8 (10-15-20 @ 19:33)  HR: 81 (10-15-20 @ 19:33) (65 - 81)  BP: 110/53 (10-15-20 @ 19:33) (110/53 - 215/88)  RR: 20 (10-15-20 @ 19:33) (20 - 20)  SpO2: 97% (10-15-20 @ 19:33) (95% - 97%)    PE:  NAD  Unlabored resp on RA  Resting comfortably    LLE:  skin intact  no gross deformity  no pain on log roll  TTP greater trochanter  difficulty with full Straight leg raise  Compartments soft, compressible  SILT sp/dp/s/s/t  Motor intact ehl/fhl/ta/gs  DP palpable  wwp, bcr    Labs:                        12.0   9.35  )-----------( 168      ( 15 Oct 2020 12:38 )             34.8     10-15    128<L>  |  89<L>  |  6<L>  ----------------------------<  106<H>  4.0   |  25  |  0.6<L>    Ca    9.0      15 Oct 2020 12:38    TPro  6.6  /  Alb  3.9  /  TBili  0.9  /  DBili  x   /  AST  40  /  ALT  35  /  AlkPhos  80  10-15    LIVER FUNCTIONS - ( 15 Oct 2020 12:38 )  Alb: 3.9 g/dL / Pro: 6.6 g/dL / ALK PHOS: 80 U/L / ALT: 35 U/L / AST: 40 U/L / GGT: x           PT/INR - ( 15 Oct 2020 12:38 )   PT: 11.80 sec;   INR: 1.03 ratio         PTT - ( 15 Oct 2020 12:38 )  PTT:28.3 sec    Imaging:  XR/CT: greater trochanter fracture with intertrochanteric extension    A/P: 75 year old Female with left greater trochanter fracture with intertrochanteric extension  - pain control  - NWB/Bedrest  - Pre-op labs  - CXR/EKG  - COVID test negative  - NPO/IVF at midnight  - medicine optimization/risk stratification  - DVT ppx: chem ppx, SCD's  - Recommend L hip ORIF pending clearances

## 2020-10-15 NOTE — ED PROVIDER NOTE - PHYSICAL EXAMINATION
CONSTITUTIONAL: Well-developed; well-nourished; in no acute distress.   SKIN: warm, dry  HEAD: Normocephalic; atraumatic.  EYES: no conjunctival injection. EOMI.   ENT: No nasal discharge; airway clear.  NECK: Supple; non tender.  CARD: S1, S2 normal; no murmurs, gallops, or rubs. Regular rate and rhythm.   RESP: No wheezes, rales or rhonchi.  ABD: soft ntnd. no CVA ttp.   EXT: limited ROM of L leg. +TTP over L lateral hip, no ecchymoses. distal pulses equal and symmetric.   LYMPH: No acute cervical adenopathy.  NEURO: Alert, oriented, grossly unremarkable.  PSYCH: Cooperative, appropriate.

## 2020-10-15 NOTE — H&P ADULT - NSICDXFAMILYHX_GEN_ALL_CORE_FT
FAMILY HISTORY:  Family history of CVA, Sibling  FHx: lung cancer, Sister  FHx: myocardial infarction, Mother at 52; Sibling

## 2020-10-15 NOTE — H&P ADULT - NSHPLABSRESULTS_GEN_ALL_CORE
cbc                          12.0   9.35  )-----------( 168      ( 15 Oct 2020 12:38 )             34.8     10-15    128<L>  |  89<L>  |  6<L>  ----------------------------<  106<H>  4.0   |  25  |  0.6<L>    Ca    9.0      15 Oct 2020 12:38    TPro  6.6  /  Alb  3.9  /  TBili  0.9  /  DBili  x   /  AST  40  /  ALT  35  /  AlkPhos  80  10-15    < from: CT Hip No Cont, Left (10.15.20 @ 14:36) >    FINDINGS:    BONES/JOINTS: Nondisplaced fracture of the greater trochanter with apparent nondisplaced extension into approximately 50% of the posterior intertrochanteric ridge (series 3/185). No definite additional fracture. No dislocation. Moderate degenerative change in the left hip joint with chondrocalcinosis.    OTHER: Vascular calcifications.    IMPRESSION:  Nondisplaced fracture ofthe greater trochanter with apparent nondisplaced extension involving 50% of the intertrochanteric ridge (along the posterior cortex).      Spoke with MANGO JACKSON on 10/15/2020 2:53 PM with readback.

## 2020-10-15 NOTE — ED ADULT NURSE REASSESSMENT NOTE - NS ED NURSE REASSESS COMMENT FT1
Patient received from [facility] Patient received from [facility]. Per report, patient awaiting ortho eval

## 2020-10-15 NOTE — H&P ADULT - ATTENDING COMMENTS
I saw and evaluated the patient. I have reviewed and agree with the findings and plan of care as documented above in the resident’s note (unless indicated differently below). Any necessary changes were made in the body of the text. I saw and evaluated the patient. I have reviewed and agree with the findings and plan of care as documented above in the resident’s note (unless indicated differently below). Any necessary changes were made in the body of the text.    CC: s/p fall w/ an inability to ambulate and hip pain    HPI:  76 yo F pt presenting after a mechanical fall. Reports that she tripped over a rug in her kitchen on 10/13/2020 (no prodromal symptoms prior to the fall and no LOC or head trauma). Fell onto her left side. C/o left sided hip pain (aching, moderately severe, non-radiating, located over the armin-lateral aspect of the hip joint, alleviated by rest, aggravated by movement). Pt denies any other concerns/complaints except as aforementioned.    ROS:  Constitutional: no fevers; no chills  Eyes: no conjunctivitis; no itching  ENT: no dysphagia; no odynophagia  CVS: no PND; no orthopnea; no chest pain  Resp: no SOB; no coughing  GI: no nausea; no vomiting; no diarrhea; no abd pain  : no dysuria; no hematuria  MSK: +hip pain (as above); +back pain (mild chronic)  Skin: no rashes; no ulcers  Neuro: no focal weakness; no headache; +s/p mechanical fall  All other systems reviewed and are negative    PMHx, home medications, SurHx, FHx and Social history as above in the corresponding sections of the note - reviewed and edited where appropriate    Exam:  Vitals: BP = 144/69; P = 71; T = 96.8; RR = 18; SpO2 > 95 on room air  General: appears stated age; cooperative  Eyes: anicteric sclera; moist conjunctiva; PERRL; EOMI  HENT: NC/AT; clear oropharynx w/ moist mucous membranes; nL hard/soft palate  Neck: supple w/ FROM; trachea midline  Lungs: no tachypnea, accessory muscle use, wheezing, rhonci or rales  CVS: RRR; S1 and S2 w/o MRGs  Abd: BS+; soft; non-tender to palpation x 4; no masses or HSM  Ext: no peripheral edema; pulses palpable distally  Skin: normal temp, turgor and texture; no rashes, ulcers or nodules  Neuro: CN II-XII intact; able to move all extremities (limited movement of LLE due to pain/discomfort); distal neurovasc intact  Psych: appropriate affect; alert and oriented to person, place, time and situation    Labs reviewed - significant for Na 128 > 125  Imaging reviewed - non-displaced fracture of greater trochanter w/ extension involving 50% of IT ridge [left sided]  EKG reviewed -   TTE (10/2019): LVEF of 60% w/ mild symmetric LVH; nL RV size/function; trace MR; trace TR; no evidence of pHTN    Prior blood work from 2019 and before on [facility] reviewed; pt was hyponatremic in the past (129 in 10/2019)    Assessment:  (1) Left greater trochanteric femoral fracture w/ IT extension  (2) Hyponatremia on blood work (moderate)  (3) HTN - on meds (controlled)  (4) Dyslipidemia - on meds (controlled)  (5) Hx of colon cancer (s/p resection) - stable w/o acute complaints  (6) Hx of basal cell ca in 2011 - resolved (out-pt f/u)    Plan:  (1) Pain control; NWB for now  (2) Ortho planning for operative intervention: NPO after midnight  (3) Repeat BMP; get urine osm, urine Na, serum osm  (4) C/w meds as dosed - reviewed and edited where appropriate  (5) Supportive care PRN analgesics and assistance w/ ADL's  (6) Dispo: pending left hip ORIF (not d/c ready at this time)    Full code    Pre-op risk stratification: I saw and evaluated the patient. I have reviewed and agree with the findings and plan of care as documented above in the resident’s note (unless indicated differently below). Any necessary changes were made in the body of the text.    CC: s/p fall w/ an inability to ambulate and hip pain    HPI:  74 yo F pt presenting after a mechanical fall. Reports that she tripped over a rug in her kitchen on 10/13/2020 (no prodromal symptoms prior to the fall and no LOC or head trauma). Fell onto her left side. C/o left sided hip pain (aching, moderately severe, non-radiating, located over the armin-lateral aspect of the hip joint, alleviated by rest, aggravated by movement). Pt denies any other concerns/complaints except as aforementioned.    ROS:  Constitutional: no fevers; no chills  Eyes: no conjunctivitis; no itching  ENT: no dysphagia; no odynophagia  CVS: no PND; no orthopnea; no chest pain  Resp: no SOB; no coughing  GI: no nausea; no vomiting; no diarrhea; no abd pain  : no dysuria; no hematuria  MSK: +hip pain (as above); +back pain (mild chronic)  Skin: no rashes; no ulcers  Neuro: no focal weakness; no headache; +s/p mechanical fall  All other systems reviewed and are negative    PMHx, home medications, SurHx, FHx and Social history as above in the corresponding sections of the note - reviewed and edited where appropriate    Exam:  Vitals: BP = 144/69; P = 71; T = 96.8; RR = 18; SpO2 > 95 on room air  General: appears stated age; cooperative  Eyes: anicteric sclera; moist conjunctiva; PERRL; EOMI  HENT: NC/AT; clear oropharynx w/ moist mucous membranes; nL hard/soft palate  Neck: supple w/ FROM; trachea midline  Lungs: no tachypnea, accessory muscle use, wheezing, rhonci or rales  CVS: RRR; S1 and S2 w/o MRGs  Abd: BS+; soft; non-tender to palpation x 4; no masses or HSM  Ext: no peripheral edema; pulses palpable distally  Skin: normal temp, turgor and texture; no rashes, ulcers or nodules  Neuro: CN II-XII intact; able to move all extremities (limited movement of LLE due to pain/discomfort); distal neurovasc intact  Psych: appropriate affect; alert and oriented to person, place, time and situation    Labs reviewed - significant for Na 128 > 125  Imaging reviewed - non-displaced fracture of greater trochanter w/ extension involving 50% of IT ridge [left sided]  EKG reviewed - sinus rhythm w/ 1st degree AV block; qTC 467  TTE (10/2019): LVEF of 60% w/ mild symmetric LVH; nL RV size/function; trace MR; trace TR; no evidence of pHTN    [facility] 2019 [facility]; pt was hyponatremic in the past (129 in 10/2019)    Assessment:  (1) Left greater trochanteric femoral fracture w/ IT extension  (2) Hyponatremia on blood work (moderate)  (3) HTN - on meds (controlled)  (4) Dyslipidemia - on meds (controlled)  (5) Hx of colon cancer (s/p resection) - stable w/o acute complaints  (6) Hx of basal cell ca in 2011 - resolved (out-pt f/u)    Plan:  (1) Pain control; NWB for now  (2) Ortho planning for operative intervention: NPO after midnight  (3) Repeat BMP; get urine osm, urine Na, serum osm  (4) C/w meds as dosed - reviewed and edited where appropriate  (5) Supportive care PRN analgesics and assistance w/ ADL's  (6) Dispo: pending left hip ORIF (not d/c ready at this time)    Full code    Pre-op risk stratification:  Pt has no known hx of CHF, CVA or ischemic heart disease. Her revised cardiac risk index puts her at a low risk (class I) of a wesley-operative MACE. Her baseline activity level was about 4 METS (was independent in terms of ADL's and housework). Her EKG is unrevealing as above. At this time, no additional cardiac testing is necessary. Medications reviewed, adjusted as appropriate and can be continued wesley-operatively. Of note, the patient does have moderately severe hyponatremia (possibly hypovolemic) which requires further monitoring (AM labs are pending). Overall the patient is likely at a moderate risk for the proposed intervention due to this wesley-operative electrolyte disorder. Given the morbidity associated w/ the fracture, the benefits of the proposed intervention likely outweigh the potential risks. I saw and evaluated the patient. I have reviewed and agree with the findings and plan of care as documented above in the resident’s note (unless indicated differently below). Any necessary changes were made in the body of the text.    CC: s/p fall w/ an inability to ambulate and hip pain    HPI:  76 yo F pt presenting after a mechanical fall. Reports that she tripped over a rug in her kitchen on 10/13/2020 (no prodromal symptoms prior to the fall and no LOC or head trauma). Fell onto her left side. C/o left sided hip pain (aching, moderately severe, non-radiating, located over the armin-lateral aspect of the hip joint, alleviated by rest, aggravated by movement). Pt denies any other concerns/complaints except as aforementioned.    ROS:  Constitutional: no fevers; no chills  Eyes: no conjunctivitis; no itching  ENT: no dysphagia; no odynophagia  CVS: no PND; no orthopnea; no chest pain  Resp: no SOB; no coughing  GI: no nausea; no vomiting; no diarrhea; no abd pain  : no dysuria; no hematuria  MSK: +hip pain (as above); +back pain (mild chronic)  Skin: no rashes; no ulcers  Neuro: no focal weakness; no headache; +s/p mechanical fall  All other systems reviewed and are negative    PMHx, home medications, SurHx, FHx and Social history as above in the corresponding sections of the note - reviewed and edited where appropriate    Exam:  Vitals: BP = 144/69; P = 71; T = 96.8; RR = 18; SpO2 > 95 on room air  General: appears stated age; cooperative  Eyes: anicteric sclera; moist conjunctiva; PERRL; EOMI  HENT: NC/AT; clear oropharynx w/ moist mucous membranes; nL hard/soft palate  Neck: supple w/ FROM; trachea midline  Lungs: no tachypnea, accessory muscle use, wheezing, rhonci or rales  CVS: RRR; S1 and S2 w/o MRGs  Abd: BS+; soft; non-tender to palpation x 4; no masses or HSM  Ext: no peripheral edema; pulses palpable distally  Skin: normal temp, turgor and texture; no rashes, ulcers or nodules  Neuro: CN II-XII intact; able to move all extremities (limited movement of LLE due to pain/discomfort); distal neurovasc intact  Psych: appropriate affect; alert and oriented to person, place, time and situation    Labs reviewed - significant for Na 128 > 125  Imaging reviewed - non-displaced fracture of greater trochanter w/ extension involving 50% of IT ridge [left sided]  EKG reviewed - sinus rhythm w/ 1st degree AV block; qTC 467  TTE (10/2019): LVEF of 60% w/ mild symmetric LVH; nL RV size/function; trace MR; trace TR; no evidence of pHTN    Prior blood work from 2019 and before on [facility] reviewed; pt was hyponatremic in the past (129 in 10/2019)    Assessment:  (1) Left greater trochanteric femoral fracture w/ IT extension  (2) Hyponatremia on blood work (moderate)  (3) HTN - on meds (controlled)  (4) Dyslipidemia - on meds (controlled)  (5) Hx of colon cancer (s/p resection) - stable w/o acute complaints  (6) Hx of basal cell ca in 2011 - resolved (out-pt f/u)    Plan:  (1) Pain control; NWB for now  (2) Ortho planning for operative intervention: NPO after midnight  (3) Repeat BMP; get urine osm, urine Na, serum osm  (4) C/w meds as dosed - reviewed and edited where appropriate  (5) Supportive care PRN analgesics and assistance w/ ADL's  (6) Dispo: pending left hip ORIF (not d/c ready at this time)    Full code    Pre-op risk stratification:  Pt has no known hx of CHF, CVA or ischemic heart disease. Her revised cardiac risk index puts her at a low risk (class I) of a wesley-operative MACE. Her baseline activity level was about 4 METS (was independent in terms of ADL's and housework). Her EKG is unrevealing as above and her prior ECHOcardiogram is also detailed above. At this time, no additional cardiac testing is necessary. Medications reviewed, adjusted as appropriate and can be continued wesley-operatively. Of note, the patient has moderately severe hyponatremia (possibly hypovolemic) which requires further monitoring (AM labs are pending). Overall the patient is likely at a moderate risk for the proposed intervention due to this wesley-operative electrolyte disorder. Given the morbidity associated w/ the fracture, the benefits of the proposed intervention likely outweigh the potential risks.    Key #[id number] I saw and evaluated the patient. I have reviewed and agree with the findings and plan of care as documented above in the resident’s note (unless indicated differently below). Any necessary changes were made in the body of the text.    CC: s/p fall w/ an inability to ambulate and hip pain    HPI:  76 yo F pt presenting after a mechanical fall. Reports that she tripped over a rug in her kitchen on 10/13/2020 (no prodromal symptoms prior to the fall and no LOC or head trauma). Fell onto her left side. C/o left sided hip pain (aching, moderately severe, non-radiating, located over the armin-lateral aspect of the hip joint, alleviated by rest, aggravated by movement). Pt denies any other concerns/complaints except as aforementioned.    ROS:  Constitutional: no fevers; no chills  Eyes: no conjunctivitis; no itching  ENT: no dysphagia; no odynophagia  CVS: no PND; no orthopnea; no chest pain  Resp: no SOB; no coughing  GI: no nausea; no vomiting; no diarrhea; no abd pain  : no dysuria; no hematuria  MSK: +hip pain (as above); +back pain (mild chronic)  Skin: no rashes; no ulcers  Neuro: no focal weakness; no headache; +s/p mechanical fall  All other systems reviewed and are negative    PMHx, home medications, SurHx, FHx and Social history as above in the corresponding sections of the note - reviewed and edited where appropriate    Exam:  Vitals: BP = 144/69; P = 71; T = 96.8; RR = 18; SpO2 > 95 on room air  General: appears stated age; cooperative  Eyes: anicteric sclera; moist conjunctiva; PERRL; EOMI  HENT: NC/AT; clear oropharynx w/ moist mucous membranes; nL hard/soft palate  Neck: supple w/ FROM; trachea midline  Lungs: no tachypnea, accessory muscle use, wheezing, rhonci or rales  CVS: RRR; S1 and S2 w/o MRGs  Abd: BS+; soft; non-tender to palpation x 4; no masses or HSM  Ext: no peripheral edema; pulses palpable distally  Skin: normal temp, turgor and texture; no rashes, ulcers or nodules  Neuro: CN II-XII intact; able to move all extremities (limited movement of LLE due to pain/discomfort); distal neurovasc intact  Psych: appropriate affect; alert and oriented to person, place, time and situation    Labs reviewed - significant for Na 128 > 125  Imaging reviewed - non-displaced fracture of greater trochanter w/ extension involving 50% of IT ridge [left sided]  EKG reviewed - sinus rhythm w/ 1st degree AV block; qTC 467  TTE (10/2019): LVEF of 60% w/ mild symmetric LVH; nL RV size/function; trace MR; trace TR; no evidence of pHTN    Prior blood work from 2019 and before on [facility] reviewed; pt was hyponatremic in the past (129 in 10/2019)    Assessment:  (1) Left greater trochanteric femoral fracture w/ IT extension  (2) Hyponatremia on blood work (moderate)  (3) HTN - on meds (controlled)  (4) Dyslipidemia - on meds (controlled)  (5) Hx of colon cancer (s/p resection) - stable w/o acute complaints  (6) Hx of basal cell ca in 2011 - resolved (out-pt f/u)    Plan:  (1) Pain control; NWB for now  (2) Ortho planning for operative intervention: NPO after midnight  (3) Repeat BMP; get urine osm, urine Na, serum osm  (4) C/w meds as dosed - reviewed and edited where appropriate  (5) Supportive care PRN analgesics and assistance w/ ADL's  (6) Dispo: pending left hip ORIF (not d/c ready at this time)    Full code    Pre-op risk stratification:  Pt has no known hx of CHF, CVA or ischemic heart disease. Her revised cardiac risk index puts her at a low risk (class I) of a wesley-operative MACE. Her baseline activity level was about 4 METS (was independent in terms of ADL's and housework). Her EKG is unrevealing as above and her prior ECHOcardiogram is also detailed above. At this time, no additional cardiac testing is necessary. Medications reviewed, adjusted as appropriate and can be continued wesley-operatively. Of note, the patient has moderately severe hyponatremia (possibly hypovolemic for which she has been started on tx w/ isotonic fluids). This will require further wesley-operative monitoring (AM pre-op labs are pending). Overall the patient is likely at a moderate risk for the proposed intervention due to this wesley-operative electrolyte disorder. Given the morbidity associated w/ the fracture, the benefits of the proposed intervention likely outweigh the potential risks.    Key #[id number]

## 2020-10-15 NOTE — H&P ADULT - ASSESSMENT
75 year old male with medical history colon cancer s/p surgery, HTN, HLD, basal cell cancer (2011), was admitted to [facility], after mechanical fall, tripped over rug. Denies any dizziness/lightheadedness. Patient fell on left side, complaining of left hip pain. Patient was found to have left greater trochanter fracture with intertrochanteric extension. Patient transferred from [facility] for orthopedic evaluation. Per ortho patient will being going for ORIF. Patient otherwise denies Nausea, vomiting, fever, chills, headaches, SOB, weight loss, chest pain, abdominal pain, muscle weakness, lower extremity swelling, urinary or bowel habit changes.   75 year old male with medical history colon cancer s/p surgery, HTN, HLD, basal cell cancer (2011), was admitted to [facility], after mechanical fall, tripped over rug. Patient transferred from [facility] for orthopedic evaluation. Per ortho patient will be going for ORIF, requesting medical clearance.    # mechanical fall  - CT: greater trochanter fracture with intertrochanteric extension  - pain control, prn percocet  - NWB/Bedrest/npo/IVF  - Pre-op labs ordered  - CXR/EKG ordered,   - ortho pending for Left ORIF  - PT/OT/Physiatry    # Hyponatremia (asymptomatic)  - IVF, fu urine and serum osmolarity, no prior records    # Primary prevention  - aspirin  chewable 81 milliGRAM(s) Oral daily    # HTN  - ATENolol  Tablet 50 milliGRAM(s) Oral daily  - enalapril 10 milliGRAM(s) Oral daily    # HLD  - atorvastatin 20 milliGRAM(s) Oral at bedtime    #) MISC  Activity: bedrest/PT  DVT ppx: lovenox   GI ppx: no need  Diet: npo  Code: Full  Dispo: medical optimization  CHG wash

## 2020-10-15 NOTE — H&P ADULT - NSHPPHYSICALEXAM_GEN_ALL_CORE
GENERAL: NAD, well-developed, AAOx3  HEENT:  Atraumatic, Normocephalic. EOMI, PERRLA, conjunctiva and sclera clear, No JVD  PULMONARY: Clear to auscultation bilaterally; No wheeze  CARDIOVASCULAR: Regular rate and rhythm; No murmurs, rubs, or gallops  GASTROINTESTINAL: Soft, Nontender, Nondistended; Bowel sounds present  MUSCULOSKELETAL:  2+ Peripheral Pulses, tender left extremity  NEUROLOGY: non-focal  SKIN: No rashes or lesions

## 2020-10-15 NOTE — CONSULT NOTE ADULT - ATTENDING COMMENTS
went over findings and imaging  left IT fracture  r/b/a explained to pt  all questions answered  will proceed with left hip ORIF

## 2020-10-15 NOTE — ED PROVIDER NOTE - NS ED ROS FT
Review of Systems:  CONSTITUTIONAL: No fever, No diaphoresis, No weight change  SKIN: No rash  HEMATOLOGIC: No abnormal bleeding or bruising  EYES: No eye pain, No blurred vision  ENT: No sore throat, No neck pain, No rhinorrhea  RESPIRATORY: No shortness of breath, No cough  CARDIAC: No chest pain, No palpitations  GI: No abdominal pain, No nausea, No vomiting, No diarrhea, No constipation, No bright red blood per rectum or melena. No flank pain  : No dysuria, frequency, hematuria.   MUSCULOSKELETAL: +joint paint, No swelling, No back pain  NEUROLOGIC: No numbness, No focal weakness, No headache, No dizziness  All other systems negative, unless specified in HPI

## 2020-10-15 NOTE — ED ADULT NURSE REASSESSMENT NOTE - NS ED NURSE REASSESS COMMENT FT1
Pt. to be transferred to [place] ED as admit/hold. Charge Channing Nava aware. CHRIS Campo aware. Pt. to be ED hold pending ortho eval at [facility].

## 2020-10-15 NOTE — ED ADULT TRIAGE NOTE - CHIEF COMPLAINT QUOTE
BIBA via DAVID from home, pt states she slipped and fell on Tuesday night at home, Denies LOC, Denies Head injury, states she landed on her left hip. complaining of pain to that area.

## 2020-10-15 NOTE — ED PROVIDER NOTE - CLINICAL SUMMARY MEDICAL DECISION MAKING FREE TEXT BOX
74yo F s/p fall, found to have left hip fx. D/w ortho, recommending to have [place] transferred [place] for admission with plan for OR.

## 2020-10-15 NOTE — H&P ADULT - NSHPSOCIALHISTORY_GEN_ALL_CORE
formal smoker, occasional drinker,  denies drugs abuse Former tobacco use (from the age of 17; 2-3 PPD; stopped 40 yrs ago)  Social alcohol use  Denies illicit drug use

## 2020-10-15 NOTE — H&P ADULT - HISTORY OF PRESENT ILLNESS
75 year old male with medical history colon cancer s/p surgery, HTN, HLD, basal cell cancer (2011), was admitted to [facility], after mechanical fall, tripped over rug. Denies any dizziness/lightheadedness. Patient fell on left side, complaining of left hip pain. Patient was found to have left greater trochanter fracture with intertrochanteric extension. Patient transferred from [facility] for orthopedic evaluation. Per ortho patient will being going for ORIF. Patient otherwise denies Nausea, vomiting, fever, chills, headaches, SOB, weight loss, chest pain, abdominal pain, muscle weakness, lower extremity swelling, urinary or bowel habit changes.   75 year old male with medical history colon cancer s/p surgery, HTN, HLD, basal cell cancer (2011), was admitted to [facility], after mechanical fall, tripped over rug. Denies any dizziness/lightheadedness. Patient fell on left side, complaining of left hip pain. Patient was found to have left greater trochanter fracture with intertrochanteric extension. Patient transferred from [facility] for orthopedic evaluation. Per ortho patient will be going for ORIF. Patient otherwise denies Nausea, vomiting, fever, chills, headaches, SOB, weight loss, chest pain, abdominal pain, muscle weakness, lower extremity swelling, urinary or bowel habit changes.

## 2020-10-15 NOTE — H&P ADULT - NSICDXPASTMEDICALHX_GEN_ALL_CORE_FT
PAST MEDICAL HISTORY:  Arterial stenosis     Colon cancer     High blood cholesterol     Hypertension

## 2020-10-15 NOTE — ED PROVIDER NOTE - ATTENDING CONTRIBUTION TO CARE
74yo F with PMHx HTN, DLD, presents s/p mechanical fall 2 days ago, c/o left hip pain, anterio-lateral, moderate, worse with movement, aching, nonradiating. Pt reports tripping on rug, falling to left side. Denies head trauma or LOC. Not on AC. Denies other pain or injury. Since fall having difficulty ambulating 2/2 pain.     Vital signs reviewed  GENERAL: Patient nontoxic appearing, NAD  HEAD: NCAT  EYES: Anicteric. PERRL, EOMI  ENT: MMM  NECK: Supple, no midline neck TTP  RESPIRATORY: Normal respiratory effort. CTA B/L. No wheezing, rales, rhonchi  CARDIOVASCULAR: Regular rate and rhythm  ABDOMEN: Soft. Nondistended. Nontender.   MUSCULOSKELETAL/EXTREMITIES: Brisk cap refill. Pedal pulses intact. Left lateral hip TTP, pain limited 2/2 ROM. Able to range knee, ankle. No gross deformity, no shortened or rotated extremity. No midline back TTP  SKIN:  Warm and dry  NEURO: AAOx3. No gross FND.

## 2020-10-16 LAB
ALBUMIN SERPL ELPH-MCNC: 3.4 G/DL — LOW (ref 3.5–5.2)
ALP SERPL-CCNC: 68 U/L — SIGNIFICANT CHANGE UP (ref 30–115)
ALT FLD-CCNC: 26 U/L — SIGNIFICANT CHANGE UP (ref 0–41)
ANION GAP SERPL CALC-SCNC: 10 MMOL/L — SIGNIFICANT CHANGE UP (ref 7–14)
ANION GAP SERPL CALC-SCNC: 10 MMOL/L — SIGNIFICANT CHANGE UP (ref 7–14)
ANION GAP SERPL CALC-SCNC: 8 MMOL/L — SIGNIFICANT CHANGE UP (ref 7–14)
ANION GAP SERPL CALC-SCNC: 9 MMOL/L — SIGNIFICANT CHANGE UP (ref 7–14)
APTT BLD: 27.5 SEC — SIGNIFICANT CHANGE UP (ref 27–39.2)
AST SERPL-CCNC: 32 U/L — SIGNIFICANT CHANGE UP (ref 0–41)
BASOPHILS # BLD AUTO: 0.05 K/UL — SIGNIFICANT CHANGE UP (ref 0–0.2)
BASOPHILS NFR BLD AUTO: 0.6 % — SIGNIFICANT CHANGE UP (ref 0–1)
BILIRUB SERPL-MCNC: 0.7 MG/DL — SIGNIFICANT CHANGE UP (ref 0.2–1.2)
BUN SERPL-MCNC: 6 MG/DL — LOW (ref 10–20)
BUN SERPL-MCNC: 6 MG/DL — LOW (ref 10–20)
BUN SERPL-MCNC: 7 MG/DL — LOW (ref 10–20)
BUN SERPL-MCNC: 9 MG/DL — LOW (ref 10–20)
CALCIUM SERPL-MCNC: 7.4 MG/DL — LOW (ref 8.5–10.1)
CALCIUM SERPL-MCNC: 7.7 MG/DL — LOW (ref 8.5–10.1)
CALCIUM SERPL-MCNC: 7.9 MG/DL — LOW (ref 8.5–10.1)
CALCIUM SERPL-MCNC: 8 MG/DL — LOW (ref 8.5–10.1)
CHLORIDE SERPL-SCNC: 92 MMOL/L — LOW (ref 98–110)
CHLORIDE SERPL-SCNC: 93 MMOL/L — LOW (ref 98–110)
CHLORIDE SERPL-SCNC: 96 MMOL/L — LOW (ref 98–110)
CHLORIDE SERPL-SCNC: 98 MMOL/L — SIGNIFICANT CHANGE UP (ref 98–110)
CHOLEST SERPL-MCNC: 140 MG/DL — SIGNIFICANT CHANGE UP (ref 100–200)
CO2 SERPL-SCNC: 23 MMOL/L — SIGNIFICANT CHANGE UP (ref 17–32)
CO2 SERPL-SCNC: 23 MMOL/L — SIGNIFICANT CHANGE UP (ref 17–32)
CO2 SERPL-SCNC: 24 MMOL/L — SIGNIFICANT CHANGE UP (ref 17–32)
CO2 SERPL-SCNC: 25 MMOL/L — SIGNIFICANT CHANGE UP (ref 17–32)
CREAT SERPL-MCNC: 0.5 MG/DL — LOW (ref 0.7–1.5)
CREAT SERPL-MCNC: 0.5 MG/DL — LOW (ref 0.7–1.5)
CREAT SERPL-MCNC: 0.6 MG/DL — LOW (ref 0.7–1.5)
CREAT SERPL-MCNC: 0.6 MG/DL — LOW (ref 0.7–1.5)
EOSINOPHIL # BLD AUTO: 0.09 K/UL — SIGNIFICANT CHANGE UP (ref 0–0.7)
EOSINOPHIL NFR BLD AUTO: 1.1 % — SIGNIFICANT CHANGE UP (ref 0–8)
GLUCOSE SERPL-MCNC: 106 MG/DL — HIGH (ref 70–99)
GLUCOSE SERPL-MCNC: 113 MG/DL — HIGH (ref 70–99)
GLUCOSE SERPL-MCNC: 95 MG/DL — SIGNIFICANT CHANGE UP (ref 70–99)
GLUCOSE SERPL-MCNC: 99 MG/DL — SIGNIFICANT CHANGE UP (ref 70–99)
HCT VFR BLD CALC: 30.2 % — LOW (ref 37–47)
HCT VFR BLD CALC: 31.2 % — LOW (ref 37–47)
HCV AB S/CO SERPL IA: 0.04 COI — SIGNIFICANT CHANGE UP
HCV AB SERPL-IMP: SIGNIFICANT CHANGE UP
HDLC SERPL-MCNC: 59 MG/DL — SIGNIFICANT CHANGE UP
HGB BLD-MCNC: 10.1 G/DL — LOW (ref 12–16)
HGB BLD-MCNC: 10.4 G/DL — LOW (ref 12–16)
IMM GRANULOCYTES NFR BLD AUTO: 0.4 % — HIGH (ref 0.1–0.3)
INR BLD: 1.1 RATIO — SIGNIFICANT CHANGE UP (ref 0.65–1.3)
LIPID PNL WITH DIRECT LDL SERPL: 67 MG/DL — SIGNIFICANT CHANGE UP (ref 4–129)
LYMPHOCYTES # BLD AUTO: 3.22 K/UL — SIGNIFICANT CHANGE UP (ref 1.2–3.4)
LYMPHOCYTES # BLD AUTO: 40.2 % — SIGNIFICANT CHANGE UP (ref 20.5–51.1)
MAGNESIUM SERPL-MCNC: 1.3 MG/DL — LOW (ref 1.8–2.4)
MAGNESIUM SERPL-MCNC: 1.7 MG/DL — LOW (ref 1.8–2.4)
MCHC RBC-ENTMCNC: 30.6 PG — SIGNIFICANT CHANGE UP (ref 27–31)
MCHC RBC-ENTMCNC: 31.4 PG — HIGH (ref 27–31)
MCHC RBC-ENTMCNC: 33.3 G/DL — SIGNIFICANT CHANGE UP (ref 32–37)
MCHC RBC-ENTMCNC: 33.4 G/DL — SIGNIFICANT CHANGE UP (ref 32–37)
MCV RBC AUTO: 91.5 FL — SIGNIFICANT CHANGE UP (ref 81–99)
MCV RBC AUTO: 94.3 FL — SIGNIFICANT CHANGE UP (ref 81–99)
MONOCYTES # BLD AUTO: 0.67 K/UL — HIGH (ref 0.1–0.6)
MONOCYTES NFR BLD AUTO: 8.4 % — SIGNIFICANT CHANGE UP (ref 1.7–9.3)
NEUTROPHILS # BLD AUTO: 3.95 K/UL — SIGNIFICANT CHANGE UP (ref 1.4–6.5)
NEUTROPHILS NFR BLD AUTO: 49.3 % — SIGNIFICANT CHANGE UP (ref 42.2–75.2)
NRBC # BLD: 0 /100 WBCS — SIGNIFICANT CHANGE UP (ref 0–0)
NRBC # BLD: 0 /100 WBCS — SIGNIFICANT CHANGE UP (ref 0–0)
PHOSPHATE SERPL-MCNC: 3.5 MG/DL — SIGNIFICANT CHANGE UP (ref 2.1–4.9)
PLATELET # BLD AUTO: 146 K/UL — SIGNIFICANT CHANGE UP (ref 130–400)
PLATELET # BLD AUTO: 152 K/UL — SIGNIFICANT CHANGE UP (ref 130–400)
POTASSIUM SERPL-MCNC: 3.7 MMOL/L — SIGNIFICANT CHANGE UP (ref 3.5–5)
POTASSIUM SERPL-MCNC: 3.8 MMOL/L — SIGNIFICANT CHANGE UP (ref 3.5–5)
POTASSIUM SERPL-MCNC: 3.9 MMOL/L — SIGNIFICANT CHANGE UP (ref 3.5–5)
POTASSIUM SERPL-MCNC: 4.1 MMOL/L — SIGNIFICANT CHANGE UP (ref 3.5–5)
POTASSIUM SERPL-SCNC: 3.7 MMOL/L — SIGNIFICANT CHANGE UP (ref 3.5–5)
POTASSIUM SERPL-SCNC: 3.8 MMOL/L — SIGNIFICANT CHANGE UP (ref 3.5–5)
POTASSIUM SERPL-SCNC: 3.9 MMOL/L — SIGNIFICANT CHANGE UP (ref 3.5–5)
POTASSIUM SERPL-SCNC: 4.1 MMOL/L — SIGNIFICANT CHANGE UP (ref 3.5–5)
PROT SERPL-MCNC: 5.5 G/DL — LOW (ref 6–8)
PROTHROM AB SERPL-ACNC: 12.7 SEC — SIGNIFICANT CHANGE UP (ref 9.95–12.87)
RBC # BLD: 3.3 M/UL — LOW (ref 4.2–5.4)
RBC # BLD: 3.31 M/UL — LOW (ref 4.2–5.4)
RBC # FLD: 12.5 % — SIGNIFICANT CHANGE UP (ref 11.5–14.5)
RBC # FLD: 12.7 % — SIGNIFICANT CHANGE UP (ref 11.5–14.5)
SODIUM SERPL-SCNC: 125 MMOL/L — LOW (ref 135–146)
SODIUM SERPL-SCNC: 127 MMOL/L — LOW (ref 135–146)
SODIUM SERPL-SCNC: 128 MMOL/L — LOW (ref 135–146)
SODIUM SERPL-SCNC: 131 MMOL/L — LOW (ref 135–146)
TOTAL CHOLESTEROL/HDL RATIO MEASUREMENT: 2.4 RATIO — LOW (ref 4–5.5)
TRIGL SERPL-MCNC: 77 MG/DL — SIGNIFICANT CHANGE UP (ref 10–149)
WBC # BLD: 8.01 K/UL — SIGNIFICANT CHANGE UP (ref 4.8–10.8)
WBC # BLD: 8.03 K/UL — SIGNIFICANT CHANGE UP (ref 4.8–10.8)
WBC # FLD AUTO: 8.01 K/UL — SIGNIFICANT CHANGE UP (ref 4.8–10.8)
WBC # FLD AUTO: 8.03 K/UL — SIGNIFICANT CHANGE UP (ref 4.8–10.8)

## 2020-10-16 PROCEDURE — 99223 1ST HOSP IP/OBS HIGH 75: CPT

## 2020-10-16 PROCEDURE — 71045 X-RAY EXAM CHEST 1 VIEW: CPT | Mod: 26

## 2020-10-16 RX ORDER — ATENOLOL 25 MG/1
50 TABLET ORAL DAILY
Refills: 0 | Status: DISCONTINUED | OUTPATIENT
Start: 2020-10-16 | End: 2020-10-20

## 2020-10-16 RX ORDER — MORPHINE SULFATE 50 MG/1
4 CAPSULE, EXTENDED RELEASE ORAL EVERY 4 HOURS
Refills: 0 | Status: DISCONTINUED | OUTPATIENT
Start: 2020-10-16 | End: 2020-10-20

## 2020-10-16 RX ORDER — ENOXAPARIN SODIUM 100 MG/ML
40 INJECTION SUBCUTANEOUS DAILY
Refills: 0 | Status: DISCONTINUED | OUTPATIENT
Start: 2020-10-17 | End: 2020-10-20

## 2020-10-16 RX ORDER — ENOXAPARIN SODIUM 100 MG/ML
40 INJECTION SUBCUTANEOUS AT BEDTIME
Refills: 0 | Status: DISCONTINUED | OUTPATIENT
Start: 2020-10-16 | End: 2020-10-16

## 2020-10-16 RX ORDER — HYDROMORPHONE HYDROCHLORIDE 2 MG/ML
1 INJECTION INTRAMUSCULAR; INTRAVENOUS; SUBCUTANEOUS
Refills: 0 | Status: DISCONTINUED | OUTPATIENT
Start: 2020-10-16 | End: 2020-10-16

## 2020-10-16 RX ORDER — SODIUM CHLORIDE 9 MG/ML
1000 INJECTION, SOLUTION INTRAVENOUS
Refills: 0 | Status: DISCONTINUED | OUTPATIENT
Start: 2020-10-16 | End: 2020-10-16

## 2020-10-16 RX ORDER — OXYCODONE HYDROCHLORIDE 5 MG/1
5 TABLET ORAL EVERY 4 HOURS
Refills: 0 | Status: DISCONTINUED | OUTPATIENT
Start: 2020-10-16 | End: 2020-10-20

## 2020-10-16 RX ORDER — DIPHENHYDRAMINE HCL 50 MG
25 CAPSULE ORAL AT BEDTIME
Refills: 0 | Status: DISCONTINUED | OUTPATIENT
Start: 2020-10-16 | End: 2020-10-20

## 2020-10-16 RX ORDER — ONDANSETRON 8 MG/1
4 TABLET, FILM COATED ORAL EVERY 6 HOURS
Refills: 0 | Status: DISCONTINUED | OUTPATIENT
Start: 2020-10-16 | End: 2020-10-20

## 2020-10-16 RX ORDER — MAGNESIUM SULFATE 500 MG/ML
2 VIAL (ML) INJECTION
Refills: 0 | Status: COMPLETED | OUTPATIENT
Start: 2020-10-16 | End: 2020-10-16

## 2020-10-16 RX ORDER — ONDANSETRON 8 MG/1
4 TABLET, FILM COATED ORAL ONCE
Refills: 0 | Status: DISCONTINUED | OUTPATIENT
Start: 2020-10-16 | End: 2020-10-16

## 2020-10-16 RX ORDER — MEPERIDINE HYDROCHLORIDE 50 MG/ML
12.5 INJECTION INTRAMUSCULAR; INTRAVENOUS; SUBCUTANEOUS
Refills: 0 | Status: DISCONTINUED | OUTPATIENT
Start: 2020-10-16 | End: 2020-10-16

## 2020-10-16 RX ORDER — MAGNESIUM SULFATE 500 MG/ML
2 VIAL (ML) INJECTION ONCE
Refills: 0 | Status: DISCONTINUED | OUTPATIENT
Start: 2020-10-16 | End: 2020-10-16

## 2020-10-16 RX ORDER — HYDROMORPHONE HYDROCHLORIDE 2 MG/ML
0.5 INJECTION INTRAMUSCULAR; INTRAVENOUS; SUBCUTANEOUS
Refills: 0 | Status: DISCONTINUED | OUTPATIENT
Start: 2020-10-16 | End: 2020-10-16

## 2020-10-16 RX ORDER — ATORVASTATIN CALCIUM 80 MG/1
20 TABLET, FILM COATED ORAL AT BEDTIME
Refills: 0 | Status: DISCONTINUED | OUTPATIENT
Start: 2020-10-16 | End: 2020-10-20

## 2020-10-16 RX ORDER — ASPIRIN/CALCIUM CARB/MAGNESIUM 324 MG
81 TABLET ORAL DAILY
Refills: 0 | Status: DISCONTINUED | OUTPATIENT
Start: 2020-10-16 | End: 2020-10-20

## 2020-10-16 RX ORDER — ACETAMINOPHEN 500 MG
650 TABLET ORAL EVERY 6 HOURS
Refills: 0 | Status: DISCONTINUED | OUTPATIENT
Start: 2020-10-16 | End: 2020-10-20

## 2020-10-16 RX ORDER — CEFAZOLIN SODIUM 1 G
2000 VIAL (EA) INJECTION EVERY 8 HOURS
Refills: 0 | Status: COMPLETED | OUTPATIENT
Start: 2020-10-16 | End: 2020-10-17

## 2020-10-16 RX ADMIN — OXYCODONE HYDROCHLORIDE 5 MILLIGRAM(S): 5 TABLET ORAL at 21:59

## 2020-10-16 RX ADMIN — HYDROMORPHONE HYDROCHLORIDE 0.5 MILLIGRAM(S): 2 INJECTION INTRAMUSCULAR; INTRAVENOUS; SUBCUTANEOUS at 15:05

## 2020-10-16 RX ADMIN — Medication 10 MILLIGRAM(S): at 05:04

## 2020-10-16 RX ADMIN — HYDROMORPHONE HYDROCHLORIDE 0.5 MILLIGRAM(S): 2 INJECTION INTRAMUSCULAR; INTRAVENOUS; SUBCUTANEOUS at 15:20

## 2020-10-16 RX ADMIN — Medication 100 MILLIGRAM(S): at 18:39

## 2020-10-16 RX ADMIN — ATORVASTATIN CALCIUM 20 MILLIGRAM(S): 80 TABLET, FILM COATED ORAL at 22:00

## 2020-10-16 RX ADMIN — OXYCODONE HYDROCHLORIDE 5 MILLIGRAM(S): 5 TABLET ORAL at 19:45

## 2020-10-16 RX ADMIN — Medication 25 GRAM(S): at 10:21

## 2020-10-16 RX ADMIN — HYDROMORPHONE HYDROCHLORIDE 0.5 MILLIGRAM(S): 2 INJECTION INTRAMUSCULAR; INTRAVENOUS; SUBCUTANEOUS at 14:50

## 2020-10-16 RX ADMIN — Medication 25 GRAM(S): at 11:40

## 2020-10-16 RX ADMIN — SODIUM CHLORIDE 100 MILLILITER(S): 9 INJECTION INTRAMUSCULAR; INTRAVENOUS; SUBCUTANEOUS at 05:34

## 2020-10-16 RX ADMIN — HYDROMORPHONE HYDROCHLORIDE 0.5 MILLIGRAM(S): 2 INJECTION INTRAMUSCULAR; INTRAVENOUS; SUBCUTANEOUS at 15:35

## 2020-10-16 RX ADMIN — CHLORHEXIDINE GLUCONATE 1 APPLICATION(S): 213 SOLUTION TOPICAL at 05:02

## 2020-10-16 RX ADMIN — ATENOLOL 50 MILLIGRAM(S): 25 TABLET ORAL at 05:04

## 2020-10-16 RX ADMIN — SODIUM CHLORIDE 125 MILLILITER(S): 9 INJECTION, SOLUTION INTRAVENOUS at 14:21

## 2020-10-16 NOTE — CONSULT NOTE ADULT - SUBJECTIVE AND OBJECTIVE BOX
HPI:  75 year old male with medical history colon cancer s/p surgery, HTN, HLD, basal cell cancer (2011), was admitted to [facility], after mechanical fall, tripped over rug. Denies any dizziness/lightheadedness. Patient fell on left side, complaining of left hip pain. Patient was found to have left greater trochanter fracture with intertrochanteric extension. Patient transferred from [facility] for orthopedic evaluation. Per ortho patient will be going for ORIF. Patient otherwise denies Nausea, vomiting, fever, chills, headaches, SOB, weight loss, chest pain, abdominal pain, muscle weakness, lower extremity swelling, urinary or bowel habit changes. s/p orif left hip fx on 10/16 wbat as per ortho        PAST MEDICAL & SURGICAL HISTORY:  Colon cancer    Arterial stenosis    High blood cholesterol    Hypertension    History of colon resection        Hospital Course:    TODAY'S SUBJECTIVE & REVIEW OF SYMPTOMS:     Constitutional WNL   Cardio WNL   Resp WNL   GI WNL  Heme WNL  Endo WNL  Skin WNL  MSK pain  Neuro WNL  Cognitive WNL  Psych WNL      MEDICATIONS  (STANDING):  aspirin  chewable 81 milliGRAM(s) Oral daily  ATENolol  Tablet 50 milliGRAM(s) Oral daily  atorvastatin 20 milliGRAM(s) Oral at bedtime  ceFAZolin   IVPB 2000 milliGRAM(s) IV Intermittent every 8 hours  enalapril 10 milliGRAM(s) Oral daily  lactated ringers. 1000 milliLiter(s) (125 mL/Hr) IV Continuous <Continuous>  multivitamin 1 Tablet(s) Oral daily    MEDICATIONS  (PRN):  acetaminophen   Tablet .. 650 milliGRAM(s) Oral every 6 hours PRN Temp greater or equal to 38C (100.4F), Mild Pain (1 - 3)  diphenhydrAMINE 25 milliGRAM(s) Oral at bedtime PRN Insomnia  HYDROmorphone  Injectable 0.5 milliGRAM(s) IV Push every 10 minutes PRN Moderate Pain (4 - 6)  HYDROmorphone  Injectable 1 milliGRAM(s) IV Push every 10 minutes PRN Severe Pain (7 - 10)  meperidine     Injectable 12.5 milliGRAM(s) IV Push every 10 minutes PRN Shivering  morphine IVPB 4 milliGRAM(s) IV Intermittent every 4 hours PRN Severe Pain (7 - 10)  ondansetron Injectable 4 milliGRAM(s) IV Push every 6 hours PRN Nausea and/or Vomiting  ondansetron Injectable 4 milliGRAM(s) IV Push once PRN Nausea and/or Vomiting  oxyCODONE    IR 5 milliGRAM(s) Oral every 4 hours PRN Moderate Pain (4 - 6)      FAMILY HISTORY:  Family history of CVA  Sibling    FHx: myocardial infarction  Mother at 52; Sibling    FHx: lung cancer  Sister        Allergies    No Known Allergies    Intolerances        SOCIAL HISTORY:    [  ] Etoh  [  ] Smoking  [  ] Substance abuse     Home Environment:  [ x ] Home Alone  [  ] Lives with Family  [  ] Home Health Aid    Dwelling:  [  ] Apartment  [xx  ] Private House  [  ] Adult Home  [  ] Skilled Nursing Facility      [  ] Short Term  [  ] Long Term  [ x ] Stairs       Elevator [  ]    FUNCTIONAL STATUS PTA: (Check all that apply)  Ambulation: [ x  ]Independent    [  ] Dependent     [  ] Non-Ambulatory  Assistive Device: [  ] SA Cane  [  ]  Q Cane  [  ] Walker  [  ]  Wheelchair  ADL : [ x ] Independent  [  ]  Dependent       Vital Signs Last 24 Hrs  T(C): 36.9 (16 Oct 2020 15:20), Max: 37 (16 Oct 2020 14:20)  T(F): 98.5 (16 Oct 2020 15:20), Max: 98.6 (16 Oct 2020 14:20)  HR: 60 (16 Oct 2020 15:35) (58 - 82)  BP: 126/60 (16 Oct 2020 15:35) (110/53 - 170/75)  BP(mean): --  RR: 18 (16 Oct 2020 15:35) (16 - 22)  SpO2: 96% (16 Oct 2020 15:35) (96% - 100%)      PHYSICAL EXAM: Awake & Alert  GENERAL: NAD  HEAD:  Normocephalic  CHEST/LUNG: Clear   HEART: S1S2+  ABDOMEN: Soft, Nontender  EXTREMITIES:  no calf tenderness    NERVOUS SYSTEM:  Cranial Nerves 2-12 intact [  ] Abnormal  [  ]  ROM: WFL all extremities [  ]  Abnormal [x  ]limited lle  Motor Strength: WFL all extremities  [  ]  Abnormal [x  ]limited lle  Sensation: intact to light touch [ x ] Abnormal [  ]    FUNCTIONAL STATUS:  Bed Mobility: Independent [  ]  Supervision [  ]  Needs Assistance [x  ]  N/A [  ]  Transfers: Independent [  ]  Supervision [  ]  Needs Assistance [x  ]  N/A [  ]   Ambulation: Independent [  ]  Supervision [  ]  Needs Assistance [  ]  N/A [  ]  ADL: Independent [  ] Requires Assistance [  ] N/A [  ]      LABS:                        10.4   8.03  )-----------( 146      ( 16 Oct 2020 15:00 )             31.2     10-16    127<L>  |  93<L>  |  7<L>  ----------------------------<  95  3.8   |  25  |  0.6<L>    Ca    7.7<L>      16 Oct 2020 05:40  Phos  3.5     10-16  Mg     1.3     10-16    TPro  5.5<L>  /  Alb  3.4<L>  /  TBili  0.7  /  DBili  x   /  AST  32  /  ALT  26  /  AlkPhos  68  10-16    PT/INR - ( 16 Oct 2020 01:37 )   PT: 12.70 sec;   INR: 1.10 ratio         PTT - ( 16 Oct 2020 01:37 )  PTT:27.5 sec      RADIOLOGY & ADDITIONAL STUDIES:

## 2020-10-16 NOTE — CHART NOTE - NSCHARTNOTEFT_GEN_A_CORE
Pt was seen and examined at bedside, pt is very tired, c/o hip pain, otherwise feels Ok, she was consulted by ortho for hip Fx and scheduled for OR today, pt is stable for surgery, will follow up in 24 hours and start PT.

## 2020-10-16 NOTE — BRIEF OPERATIVE NOTE - NSICDXBRIEFPROCEDURE_GEN_ALL_CORE_FT
PROCEDURES:  Open reduction and internal fixation (ORIF) of hip with intramedullary na 16-Oct-2020 14:13:03  Trenton Ryees

## 2020-10-16 NOTE — CONSULT NOTE ADULT - ASSESSMENT
IMPRESSION: Rehab of left hip it fx, s/p orif    PRECAUTIONS: [  ] Cardiac  [  ] Respiratory  [  ] Seizures [  ] Contact Isolation  [  ] Droplet Isolation  [  ] Other    Weight Bearing Status: wbat left leg    RECOMMENDATION:    Out of Bed to Chair     DVT/Decubiti Prophylaxis    REHAB PLAN:     [x   ] Bedside P/T 3-5 times a week   [  x ]   Bedside O/T  2-3 times a week             [   ] No Rehab Therapy Indicated                   [   ]  Speech Therapy   Conditioning/ROM                                    ADL  Bed Mobility                                               Conditioning/ROM  Transfers                                                     Bed Mobility  Sitting /Standing Balance                         Transfers                                        Gait Training                                               Sitting/Standing Balance  Stair Training [   ]Applicable                    Home equipment Eval                                                                        Splinting  [   ] Only      GOALS:   ADL   [ x  ]   Independent                    Transfers  [  x ] Independent                          Ambulation  [ x  ] Independent     [ x   ] With device                            [   ]  CG                                                         [   ]  CG                                                                  [   ] CG                            [    ] Min A                                                   [   ] Min A                                                              [   ] Min  A          DISCHARGE PLAN:   [   ]  Good candidate for Intensive Rehabilitation/Hospital based                                             Will tolerate 3hrs Intensive Rehab Daily                                       [    ]  Short Term Rehab in Skilled Nursing Facility                                       [    ]  Home with Outpatient or  services                                         [ x   ]  Possible Candidate for Intensive Hospital based Rehab

## 2020-10-16 NOTE — PHYSICAL THERAPY INITIAL EVALUATION ADULT - SPECIFY REASON(S)
Unable to see patient for PT IE no activity orders and as per MD Valero patient is scheduled for surgery (ORIF) today. Will f/u as appropriate.

## 2020-10-16 NOTE — PROGRESS NOTE ADULT - SUBJECTIVE AND OBJECTIVE BOX
----------Daily Progress Note----------    HISTORY OF PRESENT ILLNESS:  Patient is a 75y old Female who presents with a chief complaint of mechanical fall (16 Oct 2020 08:02)    Currently admitted to medicine with the primary diagnosis of Hip fracture    Today is hospital day 1d.     INTERVAL HOSPITAL COURSE / OVERNIGHT EVENTS:    Patient was seen and examined at the bedside. This morning she is resting comfortably in bed and reports no new or significant overnight events.    Review of Systems: Otherwise unremarkable     PAST MEDICAL & SURGICAL HISTORY:  Colon cancer    Arterial stenosis    High blood cholesterol    Hypertension    History of colon resection      ALLERGIES:  No Known Allergies    MEDICATIONS:  STANDING MEDICATIONS  aspirin  chewable 81 milliGRAM(s) Oral daily  ATENolol  Tablet 50 milliGRAM(s) Oral daily  atorvastatin 20 milliGRAM(s) Oral at bedtime  chlorhexidine 4% Liquid 1 Application(s) Topical <User Schedule>  enalapril 10 milliGRAM(s) Oral daily  enoxaparin Injectable 40 milliGRAM(s) SubCutaneous at bedtime  magnesium sulfate  IVPB 2 Gram(s) IV Intermittent every 2 hours  sodium chloride 0.9%. 1000 milliLiter(s) IV Continuous <Continuous>    PRN MEDICATIONS    VITALS:  T(F): 96.8  HR: 65  BP: 144/69  RR: 18  SpO2: 98%    PHYSICAL EXAM:  GENERAL: Well developed. In no acute distress. Resting comfortably in bed.  HEENT: Normocephalic, atraumatic, mucous membranes moist, PERRLA, bilateral sclera anicteric, no conjunctival injection  PULMONARY: Clear to auscultation bilaterally. No wheezing, rhonchi, or rales.  CARDIOVASCULAR: Regular rate and rhythm, S1-S2.  GASTROINTESTINAL: Soft, non-tender, non-distended. Bowel sounds present   SKIN/EXTREMITIES: No clubbing or edema  NEUROLOGIC/MUSCULOSKELETAL: AOx3. No focal deficits    LABS:                        10.1   8.01  )-----------( 152      ( 16 Oct 2020 05:40 )             30.2     10-16    127<L>  |  93<L>  |  7<L>  ----------------------------<  95  3.8   |  25  |  0.6<L>    Ca    7.7<L>      16 Oct 2020 05:40  Phos  3.5     10-16  Mg     1.3     10-16    TPro  5.5<L>  /  Alb  3.4<L>  /  TBili  0.7  /  DBili  x   /  AST  32  /  ALT  26  /  AlkPhos  68  10-16    PT/INR - ( 16 Oct 2020 01:37 )   PT: 12.70 sec;   INR: 1.10 ratio         PTT - ( 16 Oct 2020 01:37 )  PTT:27.5 sec    RADIOLOGY:  - CT L-Hip (15 Oct 2020 @ 14:36) --> Fracture of greater trochanter with intertrochanteric extension    -XRay Hip w/ Pelvis (15 Oct 20 @ 13:17) -->No evidence of fracture    -XRay of L-Femur (15 Oct 20 @ 13:15) -->No evidence of fracture    - XRay of L- Knee (15 Oct 20 @13:15) --> No evidence of fracture    - XRay of Chest (15 Oct 20 @13:14) --> No evidence of acute cardiopulmonary disease    ASSESSMENT & PLAN:   · Assessment	  Patient is a 75 year old female with PMH of colon cancer s/p surgery, HTN, HLD, basal cell cancer (2011), who was admitted to [facility] after tripping over a rug and mechanical fall. Patient transferred from [facility] for orthopedic evaluation. Per ortho patient will be going for ORIF, requesting medical clearance.    # Mechanical fall  - CT: greater trochanter fracture with intertrochanteric extension  - pain control, prn percocet  - NWB/Bedrest/npo/IVF  - Pre-op labs ordered  - CXR 10/16 completed. F/U official read  - EKG 10/15 -sinus rhythm w/ 1st degree AV block.  Otherwise normal.   - Ortho pending for Left ORIF today  - PT/OT/Physiatry    # Hyponatremia (asymptomatic)  - Serum Na 127  - on IVF  - urine Na and serum osmolality pending    # Primary prevention  - aspirin chewable 81 milliGRAM(s) Oral daily    # HTN  - ATENolol  Tablet 50 milliGRAM(s) Oral daily  - enalapril 10 milliGRAM(s) Oral daily    # HLD  - atorvastatin 20 milliGRAM(s) Oral at bedtime    #) MISC  Activity: bedrest/PT  DVT ppx: lovenox   GI ppx: not necessary  Diet: NPO  Code: Full  Dispo: medical optimization  CHG wash   ----------Daily Progress Note----------    HISTORY OF PRESENT ILLNESS:  Patient is a 75y old Female who presents with a chief complaint of mechanical fall (16 Oct 2020 08:02)    Currently admitted to medicine with the primary diagnosis of Hip fracture    Today is hospital day 1d.     INTERVAL HOSPITAL COURSE / OVERNIGHT EVENTS:    Patient was seen and examined at the bedside. This morning she is resting comfortably in bed and reports no new or significant overnight events.    Review of Systems: Otherwise unremarkable     PAST MEDICAL & SURGICAL HISTORY:  Colon cancer    Arterial stenosis    High blood cholesterol    Hypertension    History of colon resection      ALLERGIES:  No Known Allergies    MEDICATIONS:  STANDING MEDICATIONS  aspirin  chewable 81 milliGRAM(s) Oral daily  ATENolol  Tablet 50 milliGRAM(s) Oral daily  atorvastatin 20 milliGRAM(s) Oral at bedtime  chlorhexidine 4% Liquid 1 Application(s) Topical <User Schedule>  enalapril 10 milliGRAM(s) Oral daily  enoxaparin Injectable 40 milliGRAM(s) SubCutaneous at bedtime  magnesium sulfate  IVPB 2 Gram(s) IV Intermittent every 2 hours  sodium chloride 0.9%. 1000 milliLiter(s) IV Continuous <Continuous>    PRN MEDICATIONS    VITALS:  T(F): 96.8  HR: 65  BP: 144/69  RR: 18  SpO2: 98%    PHYSICAL EXAM:  GENERAL: Well developed. In no acute distress. Resting comfortably in bed.  HEENT: Normocephalic, atraumatic, mucous membranes moist, PERRLA, bilateral sclera anicteric, no conjunctival injection  PULMONARY: Clear to auscultation bilaterally. No wheezing, rhonchi, or rales.  CARDIOVASCULAR: Regular rate and rhythm, S1-S2.  GASTROINTESTINAL: Soft, non-tender, non-distended. Bowel sounds present   SKIN/EXTREMITIES: No clubbing or edema  NEUROLOGIC/MUSCULOSKELETAL: AOx3. No focal deficits    LABS:                        10.1   8.01  )-----------( 152      ( 16 Oct 2020 05:40 )             30.2     10-16    127<L>  |  93<L>  |  7<L>  ----------------------------<  95  3.8   |  25  |  0.6<L>    Ca    7.7<L>      16 Oct 2020 05:40  Phos  3.5     10-16  Mg     1.3     10-16    TPro  5.5<L>  /  Alb  3.4<L>  /  TBili  0.7  /  DBili  x   /  AST  32  /  ALT  26  /  AlkPhos  68  10-16    PT/INR - ( 16 Oct 2020 01:37 )   PT: 12.70 sec;   INR: 1.10 ratio         PTT - ( 16 Oct 2020 01:37 )  PTT:27.5 sec    RADIOLOGY:  - CT L-Hip (15 Oct 2020 @ 14:36) --> Fracture of greater trochanter with intertrochanteric extension    -XRay Hip w/ Pelvis (15 Oct 20 @ 13:17) -->No evidence of fracture    -XRay of L-Femur (15 Oct 20 @ 13:15) -->No evidence of fracture    - XRay of L- Knee (15 Oct 20 @13:15) --> No evidence of fracture    - XRay of Chest (15 Oct 20 @13:14) --> No evidence of acute cardiopulmonary disease    ASSESSMENT & PLAN:   · Assessment	  Patient is a 75 year old female with PMH of colon cancer s/p surgery, HTN, HLD, basal cell cancer (2011), who was admitted to [facility] after tripping over a rug and mechanical fall. Patient transferred from [facility] for orthopedic evaluation. Per ortho patient will be going for ORIF, requesting medical clearance.    # Mechanical fall w L hip fracture  - CT: greater trochanter fracture with intertrochanteric extension  - going for ORIF w ortho today - Medically cleared   - cont percocet for pain control  - f/u PT/OT post Sx     # Hyponatremia (asymptomatic)  - Serum Na 127  - on IVF  - urine Na and serum osmolality pending    # HTN / HLD  - cont atenolol, enalapril, statin    Activity: bedrest, NWB  DVT ppx: SCD  GI ppx: not necessary  Diet: NPO  Code: Full  Dispo: medical optimization

## 2020-10-17 ENCOUNTER — TRANSCRIPTION ENCOUNTER (OUTPATIENT)
Age: 75
End: 2020-10-17

## 2020-10-17 LAB
ALBUMIN SERPL ELPH-MCNC: 3.4 G/DL — LOW (ref 3.5–5.2)
ALP SERPL-CCNC: 67 U/L — SIGNIFICANT CHANGE UP (ref 30–115)
ALT FLD-CCNC: 22 U/L — SIGNIFICANT CHANGE UP (ref 0–41)
ANION GAP SERPL CALC-SCNC: 10 MMOL/L — SIGNIFICANT CHANGE UP (ref 7–14)
AST SERPL-CCNC: 29 U/L — SIGNIFICANT CHANGE UP (ref 0–41)
BASOPHILS # BLD AUTO: 0.05 K/UL — SIGNIFICANT CHANGE UP (ref 0–0.2)
BASOPHILS NFR BLD AUTO: 0.6 % — SIGNIFICANT CHANGE UP (ref 0–1)
BILIRUB SERPL-MCNC: 0.7 MG/DL — SIGNIFICANT CHANGE UP (ref 0.2–1.2)
BUN SERPL-MCNC: 4 MG/DL — LOW (ref 10–20)
CALCIUM SERPL-MCNC: 7.8 MG/DL — LOW (ref 8.5–10.1)
CHLORIDE SERPL-SCNC: 96 MMOL/L — LOW (ref 98–110)
CO2 SERPL-SCNC: 24 MMOL/L — SIGNIFICANT CHANGE UP (ref 17–32)
CREAT SERPL-MCNC: 0.5 MG/DL — LOW (ref 0.7–1.5)
EOSINOPHIL # BLD AUTO: 0.1 K/UL — SIGNIFICANT CHANGE UP (ref 0–0.7)
EOSINOPHIL NFR BLD AUTO: 1.1 % — SIGNIFICANT CHANGE UP (ref 0–8)
GLUCOSE SERPL-MCNC: 99 MG/DL — SIGNIFICANT CHANGE UP (ref 70–99)
HCT VFR BLD CALC: 30.5 % — LOW (ref 37–47)
HGB BLD-MCNC: 10.2 G/DL — LOW (ref 12–16)
IMM GRANULOCYTES NFR BLD AUTO: 0.5 % — HIGH (ref 0.1–0.3)
LYMPHOCYTES # BLD AUTO: 2.37 K/UL — SIGNIFICANT CHANGE UP (ref 1.2–3.4)
LYMPHOCYTES # BLD AUTO: 26.9 % — SIGNIFICANT CHANGE UP (ref 20.5–51.1)
MAGNESIUM SERPL-MCNC: 1.6 MG/DL — LOW (ref 1.8–2.4)
MCHC RBC-ENTMCNC: 31.2 PG — HIGH (ref 27–31)
MCHC RBC-ENTMCNC: 33.4 G/DL — SIGNIFICANT CHANGE UP (ref 32–37)
MCV RBC AUTO: 93.3 FL — SIGNIFICANT CHANGE UP (ref 81–99)
MONOCYTES # BLD AUTO: 0.88 K/UL — HIGH (ref 0.1–0.6)
MONOCYTES NFR BLD AUTO: 10 % — HIGH (ref 1.7–9.3)
NEUTROPHILS # BLD AUTO: 5.37 K/UL — SIGNIFICANT CHANGE UP (ref 1.4–6.5)
NEUTROPHILS NFR BLD AUTO: 60.9 % — SIGNIFICANT CHANGE UP (ref 42.2–75.2)
NRBC # BLD: 0 /100 WBCS — SIGNIFICANT CHANGE UP (ref 0–0)
PLATELET # BLD AUTO: 167 K/UL — SIGNIFICANT CHANGE UP (ref 130–400)
POTASSIUM SERPL-MCNC: 4 MMOL/L — SIGNIFICANT CHANGE UP (ref 3.5–5)
POTASSIUM SERPL-SCNC: 4 MMOL/L — SIGNIFICANT CHANGE UP (ref 3.5–5)
PROT SERPL-MCNC: 5.5 G/DL — LOW (ref 6–8)
RBC # BLD: 3.27 M/UL — LOW (ref 4.2–5.4)
RBC # FLD: 12.5 % — SIGNIFICANT CHANGE UP (ref 11.5–14.5)
SODIUM SERPL-SCNC: 130 MMOL/L — LOW (ref 135–146)
WBC # BLD: 8.81 K/UL — SIGNIFICANT CHANGE UP (ref 4.8–10.8)
WBC # FLD AUTO: 8.81 K/UL — SIGNIFICANT CHANGE UP (ref 4.8–10.8)

## 2020-10-17 PROCEDURE — 99233 SBSQ HOSP IP/OBS HIGH 50: CPT

## 2020-10-17 RX ORDER — MAGNESIUM SULFATE 500 MG/ML
2 VIAL (ML) INJECTION ONCE
Refills: 0 | Status: COMPLETED | OUTPATIENT
Start: 2020-10-17 | End: 2020-10-17

## 2020-10-17 RX ADMIN — Medication 50 GRAM(S): at 19:34

## 2020-10-17 RX ADMIN — ENOXAPARIN SODIUM 40 MILLIGRAM(S): 100 INJECTION SUBCUTANEOUS at 11:14

## 2020-10-17 RX ADMIN — Medication 100 MILLIGRAM(S): at 11:13

## 2020-10-17 RX ADMIN — ATORVASTATIN CALCIUM 20 MILLIGRAM(S): 80 TABLET, FILM COATED ORAL at 22:20

## 2020-10-17 RX ADMIN — Medication 10 MILLIGRAM(S): at 05:11

## 2020-10-17 RX ADMIN — Medication 100 MILLIGRAM(S): at 04:40

## 2020-10-17 RX ADMIN — MORPHINE SULFATE 2 MILLIGRAM(S): 50 CAPSULE, EXTENDED RELEASE ORAL at 13:59

## 2020-10-17 RX ADMIN — MORPHINE SULFATE 4 MILLIGRAM(S): 50 CAPSULE, EXTENDED RELEASE ORAL at 14:30

## 2020-10-17 RX ADMIN — Medication 1 TABLET(S): at 11:14

## 2020-10-17 RX ADMIN — Medication 81 MILLIGRAM(S): at 11:14

## 2020-10-17 RX ADMIN — ATENOLOL 50 MILLIGRAM(S): 25 TABLET ORAL at 05:11

## 2020-10-17 NOTE — PROGRESS NOTE ADULT - SUBJECTIVE AND OBJECTIVE BOX
Patient is a 75 year old female with PMH of colon cancer s/p surgery, HTN, HLD, basal cell cancer (2011), who was admitted to [facility] after tripping over a rug and mechanical fall. Patient transferred from [facility] for orthopedic evaluation. Patient is s/p ORIF 10/16.  Today is postop day # 1.   Patient is resting comfortably in bed, she c/o left hip pain on movement and burning.     PAST MEDICAL & SURGICAL HISTORY:  Colon cancer  Arterial stenosis  High blood cholesterol  Hypertension  History of colon resection      ALLERGIES:  No Known Allergies    VITALS:  T(C): 36.4 (17 Oct 2020 14:46), Max: 37.4 (16 Oct 2020 20:32)  T(F): 97.5 (17 Oct 2020 14:46), Max: 99.3 (16 Oct 2020 20:32)  HR: 72 (17 Oct 2020 14:46) (61 - 79)  BP: 140/89 (17 Oct 2020 14:46) (140/89 - 160/64)  BP(mean): --  RR: 20 (17 Oct 2020 14:46) (16 - 20)  SpO2: 96% (17 Oct 2020 00:24) (94% - 97%)    PHYSICAL EXAM:  GENERAL: NAD, pleasant, overweight   HEENT: Normocephalic, atraumatic, mucous membranes moist, PERRLA, neck supple, no JVD   PULMONARY: decreased BS at bases   CARDIOVASCULAR: Regular rate and rhythm, S1-S2.  GASTROINTESTINAL: Soft, non-tender, non-distended. Bowel sounds present   SKIN/EXTREMITIES: No clubbing or edema  NEUROLOGIC/MUSCULOSKELETAL: AOx3. No focal deficits, surgical dressing noted on left hip, no edema on LE     LABS:             10.2   8.81  )-----------( 167      ( 17 Oct 2020 06:17 )             30.5     130<L>  |  96<L>  |  4<L>  ----------------------------<  99  4.0   |  24  |  0.5<L>    Ca    7.8<L>      17 Oct 2020 06:17  Phos  3.5     10-16  Mg     1.6     10-17    TPro  5.5<L>  /  Alb  3.4<L>  /  TBili  0.7  /  DBili  x   /  AST  29  /  ALT  22  /  AlkPhos  67  10-17    PT/INR - ( 16 Oct 2020 01:37 )   PT: 12.70 sec;   INR: 1.10 ratio    PTT - ( 16 Oct 2020 01:37 )  PTT:27.5 sec    IMAGING:                        CT Hip No Cont, Left (10.15.20 @ 14:36)  IMPRESSION:  Nondisplaced fracture ofthe greater trochanter with apparent nondisplaced extension involving 50% of the intertrochanteric ridge (along the posterior cortex).    < from: Xray Chest 1 View AP/PA (10.16.20 @ 08:27) >  Impression:    No radiographic evidence of acute cardiopulmonary disease.    < end of copied text >  < from: Xray Knee 3 Views, Left (10.15.20 @ 13:15) >  No fracture or dislocation or joint effusion is seen.  Impression no fractures seen in theleft knee    < end of copied text >    MEDICATIONS  (STANDING):  aspirin  chewable 81 milliGRAM(s) Oral daily  ATENolol  Tablet 50 milliGRAM(s) Oral daily  atorvastatin 20 milliGRAM(s) Oral at bedtime  enalapril 10 milliGRAM(s) Oral daily  enoxaparin Injectable 40 milliGRAM(s) SubCutaneous daily  magnesium sulfate  IVPB 2 Gram(s) IV Intermittent once  multivitamin 1 Tablet(s) Oral daily    MEDICATIONS  (PRN):  acetaminophen   Tablet .. 650 milliGRAM(s) Oral every 6 hours PRN Temp greater or equal to 38C (100.4F), Mild Pain (1 - 3)  diphenhydrAMINE 25 milliGRAM(s) Oral at bedtime PRN Insomnia  morphine IVPB 4 milliGRAM(s) IV Intermittent every 4 hours PRN Severe Pain (7 - 10)  ondansetron Injectable 4 milliGRAM(s) IV Push every 6 hours PRN Nausea and/or Vomiting  oxyCODONE    IR 5 milliGRAM(s) Oral every 4 hours PRN Moderate Pain (4 - 6)

## 2020-10-17 NOTE — DISCHARGE NOTE PROVIDER - NSDCCPCAREPLAN_GEN_ALL_CORE_FT
PRINCIPAL DISCHARGE DIAGNOSIS  Diagnosis: Hip fracture  Assessment and Plan of Treatment: Your fall was most likely from a hip fracture which was operated on. You will need to continue to work with physical therapy and follow up with your primary care doctor and orthopedist outpatient for further evaluation and management       PRINCIPAL DISCHARGE DIAGNOSIS  Diagnosis: Hip fracture  Assessment and Plan of Treatment: Hip fracture following a fall.    You will need to continue to work with physical therapy and follow up with your primary care doctor and orthopedist outpatient for further evaluation and management  Take all mediction as prescribed      SECONDARY DISCHARGE DIAGNOSES  Diagnosis: Iron deficiency anemia  Assessment and Plan of Treatment: continue iron supplementation  check hemoglobin as outpatient within 1 week of discharge  follow up with primary care doctor for repeat colonoscopy    Diagnosis: Hypertension  Assessment and Plan of Treatment: continue blood pressure medications  check blood pressure at home  follow up with primary care doctor within 1 week    Diagnosis: Diarrhea  Assessment and Plan of Treatment: secondary to laxatives after constipation.   maintain adequate hydration  follow up with primary care doctor after 1 week  continue probiotics

## 2020-10-17 NOTE — PROGRESS NOTE ADULT - SUBJECTIVE AND OBJECTIVE BOX
HISTORY OF PRESENT ILLNESS:  Patient is a 75y old Female who presents with a chief complaint of mechanical fall (16 Oct 2020 08:02)    Currently admitted to medicine with the primary diagnosis of Hip fracture    Today is hospital day 2d. Patient is resting comfortably in bed w no new issues or overnight events. Patient is POD1 for ORIF.    PAST MEDICAL & SURGICAL HISTORY:  Colon cancer    Arterial stenosis    High blood cholesterol    Hypertension    History of colon resection      ALLERGIES:  No Known Allergies    MEDICATIONS:  STANDING MEDICATIONS  aspirin  chewable 81 milliGRAM(s) Oral daily  ATENolol  Tablet 50 milliGRAM(s) Oral daily  atorvastatin 20 milliGRAM(s) Oral at bedtime  chlorhexidine 4% Liquid 1 Application(s) Topical <User Schedule>  enalapril 10 milliGRAM(s) Oral daily  enoxaparin Injectable 40 milliGRAM(s) SubCutaneous at bedtime  magnesium sulfate  IVPB 2 Gram(s) IV Intermittent every 2 hours  sodium chloride 0.9%. 1000 milliLiter(s) IV Continuous <Continuous>    PRN MEDICATIONS    VITALS:  T(C): 36.9 (17 Oct 2020 04:45), Max: 37.4 (16 Oct 2020 20:32)  T(F): 98.4 (17 Oct 2020 04:45), Max: 99.3 (16 Oct 2020 20:32)  HR: 76 (17 Oct 2020 04:45) (58 - 79)  BP: 145/72 (17 Oct 2020 04:45) (126/60 - 161/71)  RR: 20 (17 Oct 2020 04:45) (16 - 22)  SpO2: 96% (17 Oct 2020 00:24) (94% - 100%)    PHYSICAL EXAM:  GENERAL: Well developed. In no acute distress. Resting comfortably in bed.  HEENT: Normocephalic, atraumatic, mucous membranes moist, PERRLA, bilateral sclera anicteric, no conjunctival injection  PULMONARY: Clear to auscultation bilaterally. No wheezing, rhonchi, or rales.  CARDIOVASCULAR: Regular rate and rhythm, S1-S2.  GASTROINTESTINAL: Soft, non-tender, non-distended. Bowel sounds present   SKIN/EXTREMITIES: No clubbing or edema  NEUROLOGIC/MUSCULOSKELETAL: AOx3. No focal deficits    LABS:             10.2   8.81  )-----------( 167      ( 17 Oct 2020 06:17 )             30.5     130<L>  |  96<L>  |  4<L>  ----------------------------<  99  4.0   |  24  |  0.5<L>    Ca    7.8<L>      17 Oct 2020 06:17  Phos  3.5     10-16  Mg     1.6     10-17    TPro  5.5<L>  /  Alb  3.4<L>  /  TBili  0.7  /  DBili  x   /  AST  29  /  ALT  22  /  AlkPhos  67  10-17    PT/INR - ( 16 Oct 2020 01:37 )   PT: 12.70 sec;   INR: 1.10 ratio    PTT - ( 16 Oct 2020 01:37 )  PTT:27.5 sec    IMAGING:                        CT Hip No Cont, Left (10.15.20 @ 14:36)  IMPRESSION:  Nondisplaced fracture ofthe greater trochanter with apparent nondisplaced extension involving 50% of the intertrochanteric ridge (along the posterior cortex).

## 2020-10-17 NOTE — DISCHARGE NOTE PROVIDER - HOSPITAL COURSE
75 year old male with medical history colon cancer s/p surgery, HTN, HLD, basal cell cancer (2011), was admitted to [facility], after mechanical fall, tripped over rug. Denies any dizziness/lightheadedness. Patient fell on left side, complaining of left hip pain. Patient was found to have left greater trochanter fracture with intertrochanteric extension. Patient transferred from [facility] for orthopedic evaluation.  Patient was seen by ortho who ultimately took the patient to the OR for ORIF of L hip. Patient currently stable.    D/C PENDING WORKING WITH PT   75 year old female with PMH of colon cancer s/p surgery, HTN, HLD, basal cell cancer (2011), who was admitted to [facility] after tripping over a rug and mechanical fall. Patient transferred from [facility] for orthopedic evaluation. Patient is s/p ORIF 10/16.   Pt was found to have iron deficiency started on  IV Venofer. Pt developed diarrhaea after Laxatives, she was started on IV fluids, Na level improved diarrhea is resolving will start Probiotics and replete electrolytes today.   Today pt c/o loose stool, denies abdominal pain, has left hip pain on ambulation able to participate with PT. Patient is stable to discharge to SNF.

## 2020-10-17 NOTE — DISCHARGE NOTE PROVIDER - NSDCHOSPICE_GEN_A_CORE
Requested Prescriptions   Pending Prescriptions Disp Refills     ALPRAZolam (XANAX) 0.5 MG tablet [Pharmacy Med Name: ALPRAZolam Oral Tablet 0.5 MG] 30 tablet 0     Sig: TAKE ONE TABLET BY MOUTH DAILY AS NEEDED FOR ANXIETY       There is no refill protocol information for this order      Last Written Prescription Date:  10/04/2019  Last Fill Quantity: 30,  # refills: 0   Last office visit: 5/29/2019 with prescribing provider:     Future Office Visit:     No

## 2020-10-17 NOTE — OCCUPATIONAL THERAPY INITIAL EVALUATION ADULT - SPECIFY REASON(S)
attempted to see pt for OT evaluation, however pt refused stating she does not feel well and has been coughing a lot, discussed with pt and will attempt to return in pm

## 2020-10-17 NOTE — DISCHARGE NOTE PROVIDER - NSDCMRMEDTOKEN_GEN_ALL_CORE_FT
Aspir 81:   atenolol 50 mg oral tablet: 1 tab(s) orally once a day  atorvastatin 20 mg oral tablet: 1 tab(s) orally once a day  enalapril 20 mg oral tablet: 1 tab(s) orally once a day   Aspir 81:   atenolol 50 mg oral tablet: 1 tab(s) orally once a day  atorvastatin 20 mg oral tablet: 1 tab(s) orally once a day  enalapril 20 mg oral tablet: 1 tab(s) orally once a day  Iron 100 Plus oral tablet: 1 tab(s) orally once a day   Multiple Vitamins oral tablet: 1 tab(s) orally once a day   Aspir 81:   atenolol 50 mg oral tablet: 1 tab(s) orally once a day  atorvastatin 20 mg oral tablet: 1 tab(s) orally once a day  enalapril 20 mg oral tablet: 1 tab(s) orally once a day  Iron 100 Plus oral tablet: 1 tab(s) orally once a day   Multiple Vitamins oral tablet: 1 tab(s) orally once a day  saccharomyces boulardii lyo 250 mg oral capsule: 1 cap(s) orally 2 times a day

## 2020-10-18 LAB
ALBUMIN SERPL ELPH-MCNC: 3.1 G/DL — LOW (ref 3.5–5.2)
ALP SERPL-CCNC: 65 U/L — SIGNIFICANT CHANGE UP (ref 30–115)
ALT FLD-CCNC: 14 U/L — SIGNIFICANT CHANGE UP (ref 0–41)
ANION GAP SERPL CALC-SCNC: 10 MMOL/L — SIGNIFICANT CHANGE UP (ref 7–14)
AST SERPL-CCNC: 19 U/L — SIGNIFICANT CHANGE UP (ref 0–41)
BASOPHILS # BLD AUTO: 0.03 K/UL — SIGNIFICANT CHANGE UP (ref 0–0.2)
BASOPHILS NFR BLD AUTO: 0.4 % — SIGNIFICANT CHANGE UP (ref 0–1)
BILIRUB SERPL-MCNC: 0.6 MG/DL — SIGNIFICANT CHANGE UP (ref 0.2–1.2)
BLD GP AB SCN SERPL QL: SIGNIFICANT CHANGE UP
BUN SERPL-MCNC: 4 MG/DL — LOW (ref 10–20)
CALCIUM SERPL-MCNC: 8 MG/DL — LOW (ref 8.5–10.1)
CHLORIDE SERPL-SCNC: 96 MMOL/L — LOW (ref 98–110)
CO2 SERPL-SCNC: 23 MMOL/L — SIGNIFICANT CHANGE UP (ref 17–32)
CREAT SERPL-MCNC: <0.5 MG/DL — LOW (ref 0.7–1.5)
EOSINOPHIL # BLD AUTO: 0.03 K/UL — SIGNIFICANT CHANGE UP (ref 0–0.7)
EOSINOPHIL NFR BLD AUTO: 0.4 % — SIGNIFICANT CHANGE UP (ref 0–8)
GLUCOSE SERPL-MCNC: 108 MG/DL — HIGH (ref 70–99)
HCT VFR BLD CALC: 28.6 % — LOW (ref 37–47)
HGB BLD-MCNC: 9.7 G/DL — LOW (ref 12–16)
IMM GRANULOCYTES NFR BLD AUTO: 0.3 % — SIGNIFICANT CHANGE UP (ref 0.1–0.3)
IRON SATN MFR SERPL: 14 % — LOW (ref 15–50)
IRON SATN MFR SERPL: 24 UG/DL — LOW (ref 35–150)
LYMPHOCYTES # BLD AUTO: 1.68 K/UL — SIGNIFICANT CHANGE UP (ref 1.2–3.4)
LYMPHOCYTES # BLD AUTO: 22.2 % — SIGNIFICANT CHANGE UP (ref 20.5–51.1)
MAGNESIUM SERPL-MCNC: 1.6 MG/DL — LOW (ref 1.8–2.4)
MCHC RBC-ENTMCNC: 30.9 PG — SIGNIFICANT CHANGE UP (ref 27–31)
MCHC RBC-ENTMCNC: 33.9 G/DL — SIGNIFICANT CHANGE UP (ref 32–37)
MCV RBC AUTO: 91.1 FL — SIGNIFICANT CHANGE UP (ref 81–99)
MONOCYTES # BLD AUTO: 0.66 K/UL — HIGH (ref 0.1–0.6)
MONOCYTES NFR BLD AUTO: 8.7 % — SIGNIFICANT CHANGE UP (ref 1.7–9.3)
NEUTROPHILS # BLD AUTO: 5.15 K/UL — SIGNIFICANT CHANGE UP (ref 1.4–6.5)
NEUTROPHILS NFR BLD AUTO: 68 % — SIGNIFICANT CHANGE UP (ref 42.2–75.2)
NRBC # BLD: 0 /100 WBCS — SIGNIFICANT CHANGE UP (ref 0–0)
PLATELET # BLD AUTO: 158 K/UL — SIGNIFICANT CHANGE UP (ref 130–400)
POTASSIUM SERPL-MCNC: 3.4 MMOL/L — LOW (ref 3.5–5)
POTASSIUM SERPL-SCNC: 3.4 MMOL/L — LOW (ref 3.5–5)
PROT SERPL-MCNC: 5.3 G/DL — LOW (ref 6–8)
RBC # BLD: 3.14 M/UL — LOW (ref 4.2–5.4)
RBC # BLD: 3.14 M/UL — LOW (ref 4.2–5.4)
RBC # FLD: 12.2 % — SIGNIFICANT CHANGE UP (ref 11.5–14.5)
RETICS #: 93.9 K/UL — SIGNIFICANT CHANGE UP (ref 25–125)
RETICS/RBC NFR: 3 % — HIGH (ref 0.5–1.5)
SODIUM SERPL-SCNC: 129 MMOL/L — LOW (ref 135–146)
TIBC SERPL-MCNC: 176 UG/DL — LOW (ref 220–430)
UIBC SERPL-MCNC: 152 UG/DL — SIGNIFICANT CHANGE UP (ref 110–370)
WBC # BLD: 7.57 K/UL — SIGNIFICANT CHANGE UP (ref 4.8–10.8)
WBC # FLD AUTO: 7.57 K/UL — SIGNIFICANT CHANGE UP (ref 4.8–10.8)

## 2020-10-18 PROCEDURE — 99233 SBSQ HOSP IP/OBS HIGH 50: CPT

## 2020-10-18 RX ORDER — LACTULOSE 10 G/15ML
10 SOLUTION ORAL EVERY 4 HOURS
Refills: 0 | Status: DISCONTINUED | OUTPATIENT
Start: 2020-10-18 | End: 2020-10-19

## 2020-10-18 RX ORDER — IRON SUCROSE 20 MG/ML
200 INJECTION, SOLUTION INTRAVENOUS
Refills: 0 | Status: COMPLETED | OUTPATIENT
Start: 2020-10-18 | End: 2020-10-20

## 2020-10-18 RX ORDER — MAGNESIUM SULFATE 500 MG/ML
1 VIAL (ML) INJECTION
Refills: 0 | Status: COMPLETED | OUTPATIENT
Start: 2020-10-18 | End: 2020-10-18

## 2020-10-18 RX ORDER — POTASSIUM CHLORIDE 20 MEQ
40 PACKET (EA) ORAL ONCE
Refills: 0 | Status: COMPLETED | OUTPATIENT
Start: 2020-10-18 | End: 2020-10-18

## 2020-10-18 RX ADMIN — LACTULOSE 10 GRAM(S): 10 SOLUTION ORAL at 15:59

## 2020-10-18 RX ADMIN — ENOXAPARIN SODIUM 40 MILLIGRAM(S): 100 INJECTION SUBCUTANEOUS at 11:35

## 2020-10-18 RX ADMIN — ATENOLOL 50 MILLIGRAM(S): 25 TABLET ORAL at 05:16

## 2020-10-18 RX ADMIN — Medication 1 TABLET(S): at 11:35

## 2020-10-18 RX ADMIN — Medication 100 GRAM(S): at 10:03

## 2020-10-18 RX ADMIN — IRON SUCROSE 110 MILLIGRAM(S): 20 INJECTION, SOLUTION INTRAVENOUS at 12:26

## 2020-10-18 RX ADMIN — Medication 40 MILLIEQUIVALENT(S): at 11:34

## 2020-10-18 RX ADMIN — Medication 650 MILLIGRAM(S): at 10:06

## 2020-10-18 RX ADMIN — Medication 100 GRAM(S): at 15:18

## 2020-10-18 RX ADMIN — ATORVASTATIN CALCIUM 20 MILLIGRAM(S): 80 TABLET, FILM COATED ORAL at 21:18

## 2020-10-18 RX ADMIN — Medication 81 MILLIGRAM(S): at 11:35

## 2020-10-18 RX ADMIN — Medication 10 MILLIGRAM(S): at 05:16

## 2020-10-18 RX ADMIN — Medication 650 MILLIGRAM(S): at 10:36

## 2020-10-18 NOTE — PHYSICAL THERAPY INITIAL EVALUATION ADULT - GAIT TRAINING, PT EVAL
The patient will improve gait ability using rolling walker with Supervision for 75   feet by discharge to decrease burden of care.

## 2020-10-18 NOTE — PROGRESS NOTE ADULT - SUBJECTIVE AND OBJECTIVE BOX
Patient is a 75 year old female with PMH of colon cancer s/p surgery, HTN, HLD, basal cell cancer (2011), who was admitted to [facility] after tripping over a rug and mechanical fall. Patient transferred from [facility] for orthopedic evaluation. Patient is s/p ORIF 10/16.  Today is postop day # 2. Pt was found to have iron deficiency will start IV Venofer today.    Patient is c/o constipation and left hip pain, she is very tired and dosing off during conversation.     PAST MEDICAL & SURGICAL HISTORY:  Colon cancer  Arterial stenosis  High blood cholesterol  Hypertension  History of colon resection      ALLERGIES:  No Known Allergies    VITALS:  T(C): 36.6 (18 Oct 2020 05:05), Max: 36.6 (18 Oct 2020 05:05)  T(F): 97.9 (18 Oct 2020 05:05), Max: 97.9 (18 Oct 2020 05:05)  HR: 83 (18 Oct 2020 05:05) (72 - 83)  BP: 156/83 (18 Oct 2020 05:05) (140/89 - 156/83)  BP(mean): --  RR: 20 (18 Oct 2020 05:05) (20 - 20)  SpO2: --    PHYSICAL EXAM:  GENERAL: NAD, pleasant, overweight   HEENT: Normocephalic, atraumatic, mucous membranes moist, PERRLA, neck supple, no JVD   PULMONARY: decreased BS at bases   CARDIOVASCULAR: Regular rate and rhythm, S1-S2.  GASTROINTESTINAL: Soft, non-tender, distended. Bowel sounds present   SKIN/EXTREMITIES: No clubbing or edema  NEUROLOGIC/MUSCULOSKELETAL: AOx3. No focal deficits, surgical dressing noted on left hip, no edema on LE     LABS:                          9.7    7.57  )-----------( 158      ( 18 Oct 2020 06:58 )             28.6   10-18    129<L>  |  96<L>  |  4<L>  ----------------------------<  108<H>  3.4<L>   |  23  |  <0.5<L>    Ca    8.0<L>      18 Oct 2020 06:58  Mg     1.6     10-18    TPro  5.3<L>  /  Alb  3.1<L>  /  TBili  0.6  /  DBili  x   /  AST  19  /  ALT  14  /  AlkPhos  65  10-18      PT/INR - ( 16 Oct 2020 01:37 )   PT: 12.70 sec;   INR: 1.10 ratio    PTT - ( 16 Oct 2020 01:37 )  PTT:27.5 sec    IMAGING:                        CT Hip No Cont, Left (10.15.20 @ 14:36)  IMPRESSION:  Nondisplaced fracture ofthe greater trochanter with apparent nondisplaced extension involving 50% of the intertrochanteric ridge (along the posterior cortex).    < from: Xray Chest 1 View AP/PA (10.16.20 @ 08:27) >  Impression:    No radiographic evidence of acute cardiopulmonary disease.    < end of copied text >  < from: Xray Knee 3 Views, Left (10.15.20 @ 13:15) >  No fracture or dislocation or joint effusion is seen.  Impression no fractures seen in theleft knee    < end of copied text >    MEDICATIONS  (STANDING):  aspirin  chewable 81 milliGRAM(s) Oral daily  ATENolol  Tablet 50 milliGRAM(s) Oral daily  atorvastatin 20 milliGRAM(s) Oral at bedtime  enalapril 10 milliGRAM(s) Oral daily  enoxaparin Injectable 40 milliGRAM(s) SubCutaneous daily  iron sucrose IVPB 200 milliGRAM(s) IV Intermittent <User Schedule>  magnesium sulfate  IVPB 1 Gram(s) IV Intermittent every 2 hours  multivitamin 1 Tablet(s) Oral daily    MEDICATIONS  (PRN):  acetaminophen   Tablet .. 650 milliGRAM(s) Oral every 6 hours PRN Temp greater or equal to 38C (100.4F), Mild Pain (1 - 3)  diphenhydrAMINE 25 milliGRAM(s) Oral at bedtime PRN Insomnia  morphine IVPB 4 milliGRAM(s) IV Intermittent every 4 hours PRN Severe Pain (7 - 10)  ondansetron Injectable 4 milliGRAM(s) IV Push every 6 hours PRN Nausea and/or Vomiting  oxyCODONE    IR 5 milliGRAM(s) Oral every 4 hours PRN Moderate Pain (4 - 6)

## 2020-10-18 NOTE — PHYSICAL THERAPY INITIAL EVALUATION ADULT - IMPAIRMENTS FOUND, PT EVAL
aerobic capacity/endurance/ergonomics and body mechanics/gait, locomotion, and balance/muscle strength/poor safety awareness

## 2020-10-18 NOTE — PHYSICAL THERAPY INITIAL EVALUATION ADULT - TRANSFER TRAINING, PT EVAL
The patient will improve transfers to Supervision using rolling walker by discharge  to decrease burden of care.

## 2020-10-18 NOTE — PHYSICAL THERAPY INITIAL EVALUATION ADULT - PERTINENT HX OF CURRENT PROBLEM, REHAB EVAL
75 year old male with medical history colon cancer s/p surgery, HTN, HLD, basal cell cancer (2011), was admitted to [facility], after mechanical fall,  resulted to L greater trochanter fracture with intertrochanteric extension.  s/p ORIF L hip 10/16

## 2020-10-18 NOTE — PHYSICAL THERAPY INITIAL EVALUATION ADULT - ADDITIONAL COMMENTS
lives alone apt building, no elevator, has 49 steps total (4th flr) however staying now with son with 3 ELAN and 12 steps to main floor

## 2020-10-19 LAB
ALBUMIN SERPL ELPH-MCNC: 3.1 G/DL — LOW (ref 3.5–5.2)
ALP SERPL-CCNC: 65 U/L — SIGNIFICANT CHANGE UP (ref 30–115)
ALT FLD-CCNC: 11 U/L — SIGNIFICANT CHANGE UP (ref 0–41)
ANION GAP SERPL CALC-SCNC: 11 MMOL/L — SIGNIFICANT CHANGE UP (ref 7–14)
AST SERPL-CCNC: 17 U/L — SIGNIFICANT CHANGE UP (ref 0–41)
BASOPHILS # BLD AUTO: 0.03 K/UL — SIGNIFICANT CHANGE UP (ref 0–0.2)
BASOPHILS NFR BLD AUTO: 0.4 % — SIGNIFICANT CHANGE UP (ref 0–1)
BILIRUB SERPL-MCNC: 0.6 MG/DL — SIGNIFICANT CHANGE UP (ref 0.2–1.2)
BUN SERPL-MCNC: 4 MG/DL — LOW (ref 10–20)
CALCIUM SERPL-MCNC: 8 MG/DL — LOW (ref 8.5–10.1)
CHLORIDE SERPL-SCNC: 94 MMOL/L — LOW (ref 98–110)
CO2 SERPL-SCNC: 23 MMOL/L — SIGNIFICANT CHANGE UP (ref 17–32)
CREAT SERPL-MCNC: <0.5 MG/DL — LOW (ref 0.7–1.5)
EOSINOPHIL # BLD AUTO: 0.08 K/UL — SIGNIFICANT CHANGE UP (ref 0–0.7)
EOSINOPHIL NFR BLD AUTO: 1.2 % — SIGNIFICANT CHANGE UP (ref 0–8)
FERRITIN SERPL-MCNC: 222 NG/ML — HIGH (ref 15–150)
FOLATE SERPL-MCNC: 9.7 NG/ML — SIGNIFICANT CHANGE UP
GLUCOSE SERPL-MCNC: 95 MG/DL — SIGNIFICANT CHANGE UP (ref 70–99)
HAPTOGLOB SERPL-MCNC: 258 MG/DL — HIGH (ref 34–200)
HCT VFR BLD CALC: 28.3 % — LOW (ref 37–47)
HGB BLD-MCNC: 9.6 G/DL — LOW (ref 12–16)
IMM GRANULOCYTES NFR BLD AUTO: 0.4 % — HIGH (ref 0.1–0.3)
LYMPHOCYTES # BLD AUTO: 2.07 K/UL — SIGNIFICANT CHANGE UP (ref 1.2–3.4)
LYMPHOCYTES # BLD AUTO: 30.8 % — SIGNIFICANT CHANGE UP (ref 20.5–51.1)
MAGNESIUM SERPL-MCNC: 1.4 MG/DL — LOW (ref 1.8–2.4)
MCHC RBC-ENTMCNC: 30.7 PG — SIGNIFICANT CHANGE UP (ref 27–31)
MCHC RBC-ENTMCNC: 33.9 G/DL — SIGNIFICANT CHANGE UP (ref 32–37)
MCV RBC AUTO: 90.4 FL — SIGNIFICANT CHANGE UP (ref 81–99)
MONOCYTES # BLD AUTO: 0.72 K/UL — HIGH (ref 0.1–0.6)
MONOCYTES NFR BLD AUTO: 10.7 % — HIGH (ref 1.7–9.3)
NEUTROPHILS # BLD AUTO: 3.79 K/UL — SIGNIFICANT CHANGE UP (ref 1.4–6.5)
NEUTROPHILS NFR BLD AUTO: 56.5 % — SIGNIFICANT CHANGE UP (ref 42.2–75.2)
NRBC # BLD: 0 /100 WBCS — SIGNIFICANT CHANGE UP (ref 0–0)
PLATELET # BLD AUTO: 165 K/UL — SIGNIFICANT CHANGE UP (ref 130–400)
POTASSIUM SERPL-MCNC: 3.3 MMOL/L — LOW (ref 3.5–5)
POTASSIUM SERPL-SCNC: 3.3 MMOL/L — LOW (ref 3.5–5)
PROT SERPL-MCNC: 5.2 G/DL — LOW (ref 6–8)
RBC # BLD: 3.13 M/UL — LOW (ref 4.2–5.4)
RBC # FLD: 12.1 % — SIGNIFICANT CHANGE UP (ref 11.5–14.5)
SARS-COV-2 RNA SPEC QL NAA+PROBE: SIGNIFICANT CHANGE UP
SODIUM SERPL-SCNC: 128 MMOL/L — LOW (ref 135–146)
T4 FREE SERPL-MCNC: 1.1 NG/DL — SIGNIFICANT CHANGE UP (ref 0.9–1.8)
TSH SERPL-MCNC: 2.73 UIU/ML — SIGNIFICANT CHANGE UP (ref 0.27–4.2)
VIT B12 SERPL-MCNC: 568 PG/ML — SIGNIFICANT CHANGE UP (ref 232–1245)
WBC # BLD: 6.72 K/UL — SIGNIFICANT CHANGE UP (ref 4.8–10.8)
WBC # FLD AUTO: 6.72 K/UL — SIGNIFICANT CHANGE UP (ref 4.8–10.8)

## 2020-10-19 PROCEDURE — 99233 SBSQ HOSP IP/OBS HIGH 50: CPT

## 2020-10-19 RX ORDER — BENZOYL PEROXIDE MICRONIZED 5.8 %
1 TOWELETTE (EA) TOPICAL
Qty: 30 | Refills: 0
Start: 2020-10-19 | End: 2020-11-17

## 2020-10-19 RX ORDER — MAGNESIUM SULFATE 500 MG/ML
2 VIAL (ML) INJECTION ONCE
Refills: 0 | Status: COMPLETED | OUTPATIENT
Start: 2020-10-19 | End: 2020-10-19

## 2020-10-19 RX ORDER — POTASSIUM CHLORIDE 20 MEQ
20 PACKET (EA) ORAL
Refills: 0 | Status: COMPLETED | OUTPATIENT
Start: 2020-10-19 | End: 2020-10-19

## 2020-10-19 RX ORDER — SODIUM CHLORIDE 9 MG/ML
1000 INJECTION INTRAMUSCULAR; INTRAVENOUS; SUBCUTANEOUS
Refills: 0 | Status: DISCONTINUED | OUTPATIENT
Start: 2020-10-19 | End: 2020-10-20

## 2020-10-19 RX ADMIN — Medication 81 MILLIGRAM(S): at 11:10

## 2020-10-19 RX ADMIN — IRON SUCROSE 110 MILLIGRAM(S): 20 INJECTION, SOLUTION INTRAVENOUS at 11:09

## 2020-10-19 RX ADMIN — Medication 50 GRAM(S): at 09:10

## 2020-10-19 RX ADMIN — ENOXAPARIN SODIUM 40 MILLIGRAM(S): 100 INJECTION SUBCUTANEOUS at 11:10

## 2020-10-19 RX ADMIN — SODIUM CHLORIDE 75 MILLILITER(S): 9 INJECTION INTRAMUSCULAR; INTRAVENOUS; SUBCUTANEOUS at 15:34

## 2020-10-19 RX ADMIN — Medication 50 MILLIEQUIVALENT(S): at 15:01

## 2020-10-19 RX ADMIN — Medication 50 MILLIEQUIVALENT(S): at 12:14

## 2020-10-19 RX ADMIN — Medication 5 MILLIGRAM(S): at 01:10

## 2020-10-19 RX ADMIN — Medication 1 TABLET(S): at 11:10

## 2020-10-19 RX ADMIN — ATORVASTATIN CALCIUM 20 MILLIGRAM(S): 80 TABLET, FILM COATED ORAL at 21:45

## 2020-10-19 RX ADMIN — Medication 50 MILLIEQUIVALENT(S): at 09:10

## 2020-10-19 RX ADMIN — ATENOLOL 50 MILLIGRAM(S): 25 TABLET ORAL at 05:45

## 2020-10-19 RX ADMIN — Medication 10 MILLIGRAM(S): at 05:45

## 2020-10-19 NOTE — PROGRESS NOTE ADULT - SUBJECTIVE AND OBJECTIVE BOX
RUBEN RICE 75y Female  MRN#: [id number]      Patient is s/p ORIF 10/16.Today is postop day # 3. Patient  complains of mild   left hip pain. No other issues overnight     OBJECTIVE  PAST MEDICAL & SURGICAL HISTORY  Colon cancer    Arterial stenosis    High blood cholesterol    Hypertension    History of colon resection      ALLERGIES:  No Known Allergies    MEDICATIONS:  STANDING MEDICATIONS  aspirin  chewable 81 milliGRAM(s) Oral daily  ATENolol  Tablet 50 milliGRAM(s) Oral daily  atorvastatin 20 milliGRAM(s) Oral at bedtime  enalapril 20 milliGRAM(s) Oral daily  enoxaparin Injectable 40 milliGRAM(s) SubCutaneous daily  iron sucrose IVPB 200 milliGRAM(s) IV Intermittent <User Schedule>  multivitamin 1 Tablet(s) Oral daily  potassium chloride  20 mEq/100 mL IVPB 20 milliEquivalent(s) IV Intermittent every 2 hours    PRN MEDICATIONS  acetaminophen   Tablet .. 650 milliGRAM(s) Oral every 6 hours PRN  diphenhydrAMINE 25 milliGRAM(s) Oral at bedtime PRN  morphine IVPB 4 milliGRAM(s) IV Intermittent every 4 hours PRN  ondansetron Injectable 4 milliGRAM(s) IV Push every 6 hours PRN  oxyCODONE    IR 5 milliGRAM(s) Oral every 4 hours PRN      VITAL SIGNS: Last 24 Hours  T(C): 36.3 (19 Oct 2020 04:45), Max: 36.5 (18 Oct 2020 21:13)  T(F): 97.4 (19 Oct 2020 04:45), Max: 97.7 (18 Oct 2020 21:13)  HR: 70 (19 Oct 2020 09:41) (65 - 77)  BP: 145/65 (19 Oct 2020 06:51) (112/58 - 204/83)  BP(mean): --  RR: 18 (19 Oct 2020 06:51) (18 - 20)  SpO2: 96% (19 Oct 2020 09:41) (96% - 97%)      Physical Exam   GENERAL: NAD, pleasant, overweight   HEENT: Normocephalic, atraumatic, mucous membranes moist, PERRLA, neck supple, no JVD   PULMONARY: decreased BS at bases   CARDIOVASCULAR: Regular rate and rhythm, S1-S2.  GASTROINTESTINAL: Soft, non-tender, distended. Bowel sounds present   SKIN/EXTREMITIES: No clubbing or edema  NEUROLOGIC/MUSCULOSKELETAL: AOx3. No focal deficits, surgical dressing noted on left hip, no edema on LE     LABS:                        9.6    6.72  )-----------( 165      ( 19 Oct 2020 06:10 )             28.3     10-19    128<L>  |  94<L>  |  4<L>  ----------------------------<  95  3.3<L>   |  23  |  <0.5<L>    Ca    8.0<L>      19 Oct 2020 06:10  Mg     1.4     10-19    TPro  5.2<L>  /  Alb  3.1<L>  /  TBili  0.6  /  DBili  x   /  AST  17  /  ALT  11  /  AlkPhos  65  10-19    RADIOLOGY:    CT Hip No Cont, Left (10.15.20 @ 14:36)  IMPRESSION:  Nondisplaced fracture ofthe greater trochanter with apparent nondisplaced extension involving 50% of the intertrochanteric ridge (along the posterior cortex).    < from: Xray Chest 1 View AP/PA (10.16.20 @ 08:27) >  Impression:    No radiographic evidence of acute cardiopulmonary disease.    < end of copied text >  < from: Xray Knee 3 Views, Left (10.15.20 @ 13:15) >  No fracture or dislocation or joint effusion is seen.  Impression no fractures seen in theleft knee    < end of copied text >

## 2020-10-19 NOTE — OCCUPATIONAL THERAPY INITIAL EVALUATION ADULT - PERTINENT HX OF CURRENT PROBLEM, REHAB EVAL
75 year old female with medical history colon cancer s/p surgery, HTN, HLD, basal cell cancer (2011), was admitted to [facility], after mechanical fall,  resulted to L greater trochanter fracture with intertrochanteric extension.  s/p ORIF L hip 10/16

## 2020-10-19 NOTE — PROGRESS NOTE ADULT - SUBJECTIVE AND OBJECTIVE BOX
Patient is a 75 year old female with PMH of colon cancer s/p surgery, HTN, HLD, basal cell cancer (2011), who was admitted to [facility] after tripping over a rug and mechanical fall. Patient transferred from [facility] for orthopedic evaluation. Patient is s/p ORIF 10/16.   Pt was found to have iron deficiency started on  IV Venofer. In last 24 hours pt developed diarrhea after Laxatives, will start IV fluids and replete electrolytes.   Today pt feels much better, has loose stool after Laxatives, pain is better controlled, she was able to work with PT.     PAST MEDICAL & SURGICAL HISTORY:  Colon cancer  Arterial stenosis  High blood cholesterol  Hypertension  History of colon resection      ALLERGIES:  No Known Allergies    VITALS:  T(C): 35.8 (19 Oct 2020 15:03), Max: 36.5 (18 Oct 2020 21:13)  T(F): 96.5 (19 Oct 2020 15:03), Max: 97.7 (18 Oct 2020 21:13)  HR: 67 (19 Oct 2020 15:03) (65 - 77)  BP: 149/65 (19 Oct 2020 15:03) (145/65 - 204/83)  BP(mean): --  RR: 18 (19 Oct 2020 15:03) (18 - 20)  SpO2: 96% (19 Oct 2020 09:41) (96% - 97%)    PHYSICAL EXAM:  GENERAL: NAD, pleasant, overweight   HEENT: Normocephalic, atraumatic, mucous membranes moist, PERRLA, neck supple, no JVD   PULMONARY: decreased BS at bases   CARDIOVASCULAR: Regular rate and rhythm, S1-S2.  GASTROINTESTINAL: Soft, non-tender, distended. Bowel sounds present   SKIN/EXTREMITIES: No clubbing or edema  NEUROLOGIC/MUSCULOSKELETAL: AOx3. No focal deficits, surgical dressing noted on left hip, no edema on LE     LABS:                          9.6    6.72  )-----------( 165      ( 19 Oct 2020 06:10 )             28.3   10-19    128<L>  |  94<L>  |  4<L>  ----------------------------<  95  3.3<L>   |  23  |  <0.5<L>    Ca    8.0<L>      19 Oct 2020 06:10  Mg     1.4     10-19    TPro  5.2<L>  /  Alb  3.1<L>  /  TBili  0.6  /  DBili  x   /  AST  17  /  ALT  11  /  AlkPhos  65  10-19        PT/INR - ( 16 Oct 2020 01:37 )   PT: 12.70 sec;   INR: 1.10 ratio    PTT - ( 16 Oct 2020 01:37 )  PTT:27.5 sec    IMAGING:                        CT Hip No Cont, Left (10.15.20 @ 14:36)  IMPRESSION:  Nondisplaced fracture ofthe greater trochanter with apparent nondisplaced extension involving 50% of the intertrochanteric ridge (along the posterior cortex).    < from: Xray Chest 1 View AP/PA (10.16.20 @ 08:27) >  Impression:    No radiographic evidence of acute cardiopulmonary disease.    < end of copied text >  < from: Xray Knee 3 Views, Left (10.15.20 @ 13:15) >  No fracture or dislocation or joint effusion is seen.  Impression no fractures seen in theleft knee    < end of copied text >    MEDICATIONS  (STANDING):  aspirin  chewable 81 milliGRAM(s) Oral daily  ATENolol  Tablet 50 milliGRAM(s) Oral daily  atorvastatin 20 milliGRAM(s) Oral at bedtime  enalapril 10 milliGRAM(s) Oral daily  enoxaparin Injectable 40 milliGRAM(s) SubCutaneous daily  iron sucrose IVPB 200 milliGRAM(s) IV Intermittent <User Schedule>  magnesium sulfate  IVPB 1 Gram(s) IV Intermittent every 2 hours  multivitamin 1 Tablet(s) Oral daily    MEDICATIONS  (PRN):  acetaminophen   Tablet .. 650 milliGRAM(s) Oral every 6 hours PRN Temp greater or equal to 38C (100.4F), Mild Pain (1 - 3)  diphenhydrAMINE 25 milliGRAM(s) Oral at bedtime PRN Insomnia  morphine IVPB 4 milliGRAM(s) IV Intermittent every 4 hours PRN Severe Pain (7 - 10)  ondansetron Injectable 4 milliGRAM(s) IV Push every 6 hours PRN Nausea and/or Vomiting  oxyCODONE    IR 5 milliGRAM(s) Oral every 4 hours PRN Moderate Pain (4 - 6)

## 2020-10-20 ENCOUNTER — TRANSCRIPTION ENCOUNTER (OUTPATIENT)
Age: 75
End: 2020-10-20

## 2020-10-20 VITALS
TEMPERATURE: 97 F | DIASTOLIC BLOOD PRESSURE: 56 MMHG | SYSTOLIC BLOOD PRESSURE: 158 MMHG | HEART RATE: 69 BPM | RESPIRATION RATE: 18 BRPM

## 2020-10-20 LAB
ALBUMIN SERPL ELPH-MCNC: 2.9 G/DL — LOW (ref 3.5–5.2)
ALP SERPL-CCNC: 64 U/L — SIGNIFICANT CHANGE UP (ref 30–115)
ALT FLD-CCNC: 13 U/L — SIGNIFICANT CHANGE UP (ref 0–41)
ANION GAP SERPL CALC-SCNC: 8 MMOL/L — SIGNIFICANT CHANGE UP (ref 7–14)
AST SERPL-CCNC: 25 U/L — SIGNIFICANT CHANGE UP (ref 0–41)
BASOPHILS # BLD AUTO: 0.03 K/UL — SIGNIFICANT CHANGE UP (ref 0–0.2)
BASOPHILS NFR BLD AUTO: 0.6 % — SIGNIFICANT CHANGE UP (ref 0–1)
BILIRUB SERPL-MCNC: 1 MG/DL — SIGNIFICANT CHANGE UP (ref 0.2–1.2)
BUN SERPL-MCNC: 5 MG/DL — LOW (ref 10–20)
CALCIUM SERPL-MCNC: 8.2 MG/DL — LOW (ref 8.5–10.1)
CHLORIDE SERPL-SCNC: 97 MMOL/L — LOW (ref 98–110)
CO2 SERPL-SCNC: 24 MMOL/L — SIGNIFICANT CHANGE UP (ref 17–32)
CREAT SERPL-MCNC: <0.5 MG/DL — LOW (ref 0.7–1.5)
EOSINOPHIL # BLD AUTO: 0.08 K/UL — SIGNIFICANT CHANGE UP (ref 0–0.7)
EOSINOPHIL NFR BLD AUTO: 1.7 % — SIGNIFICANT CHANGE UP (ref 0–8)
GLUCOSE SERPL-MCNC: 114 MG/DL — HIGH (ref 70–99)
HCT VFR BLD CALC: 27 % — LOW (ref 37–47)
HGB BLD-MCNC: 9.1 G/DL — LOW (ref 12–16)
IMM GRANULOCYTES NFR BLD AUTO: 0.4 % — HIGH (ref 0.1–0.3)
LYMPHOCYTES # BLD AUTO: 1.26 K/UL — SIGNIFICANT CHANGE UP (ref 1.2–3.4)
LYMPHOCYTES # BLD AUTO: 27 % — SIGNIFICANT CHANGE UP (ref 20.5–51.1)
MAGNESIUM SERPL-MCNC: 1.3 MG/DL — LOW (ref 1.8–2.4)
MCHC RBC-ENTMCNC: 31.1 PG — HIGH (ref 27–31)
MCHC RBC-ENTMCNC: 33.7 G/DL — SIGNIFICANT CHANGE UP (ref 32–37)
MCV RBC AUTO: 92.2 FL — SIGNIFICANT CHANGE UP (ref 81–99)
MONOCYTES # BLD AUTO: 0.52 K/UL — SIGNIFICANT CHANGE UP (ref 0.1–0.6)
MONOCYTES NFR BLD AUTO: 11.2 % — HIGH (ref 1.7–9.3)
NEUTROPHILS # BLD AUTO: 2.75 K/UL — SIGNIFICANT CHANGE UP (ref 1.4–6.5)
NEUTROPHILS NFR BLD AUTO: 59.1 % — SIGNIFICANT CHANGE UP (ref 42.2–75.2)
NRBC # BLD: 0 /100 WBCS — SIGNIFICANT CHANGE UP (ref 0–0)
PLATELET # BLD AUTO: 175 K/UL — SIGNIFICANT CHANGE UP (ref 130–400)
POTASSIUM SERPL-MCNC: 3 MMOL/L — LOW (ref 3.5–5)
POTASSIUM SERPL-SCNC: 3 MMOL/L — LOW (ref 3.5–5)
PROT SERPL-MCNC: 5.1 G/DL — LOW (ref 6–8)
RBC # BLD: 2.93 M/UL — LOW (ref 4.2–5.4)
RBC # FLD: 12.1 % — SIGNIFICANT CHANGE UP (ref 11.5–14.5)
SODIUM SERPL-SCNC: 129 MMOL/L — LOW (ref 135–146)
WBC # BLD: 4.66 K/UL — LOW (ref 4.8–10.8)
WBC # FLD AUTO: 4.66 K/UL — LOW (ref 4.8–10.8)

## 2020-10-20 PROCEDURE — 99233 SBSQ HOSP IP/OBS HIGH 50: CPT

## 2020-10-20 RX ORDER — POTASSIUM CHLORIDE 20 MEQ
20 PACKET (EA) ORAL ONCE
Refills: 0 | Status: COMPLETED | OUTPATIENT
Start: 2020-10-20 | End: 2020-10-20

## 2020-10-20 RX ORDER — MAGNESIUM SULFATE 500 MG/ML
1 VIAL (ML) INJECTION ONCE
Refills: 0 | Status: COMPLETED | OUTPATIENT
Start: 2020-10-20 | End: 2020-10-20

## 2020-10-20 RX ORDER — POTASSIUM CHLORIDE 20 MEQ
40 PACKET (EA) ORAL ONCE
Refills: 0 | Status: COMPLETED | OUTPATIENT
Start: 2020-10-20 | End: 2020-10-20

## 2020-10-20 RX ORDER — SACCHAROMYCES BOULARDII 250 MG
250 POWDER IN PACKET (EA) ORAL
Refills: 0 | Status: DISCONTINUED | OUTPATIENT
Start: 2020-10-20 | End: 2020-10-20

## 2020-10-20 RX ORDER — SACCHAROMYCES BOULARDII 250 MG
1 POWDER IN PACKET (EA) ORAL
Qty: 0 | Refills: 0 | DISCHARGE
Start: 2020-10-20

## 2020-10-20 RX ORDER — MAGNESIUM OXIDE 400 MG ORAL TABLET 241.3 MG
400 TABLET ORAL DAILY
Refills: 0 | Status: DISCONTINUED | OUTPATIENT
Start: 2020-10-20 | End: 2020-10-20

## 2020-10-20 RX ADMIN — Medication 1 TABLET(S): at 11:39

## 2020-10-20 RX ADMIN — ENOXAPARIN SODIUM 40 MILLIGRAM(S): 100 INJECTION SUBCUTANEOUS at 11:39

## 2020-10-20 RX ADMIN — MAGNESIUM OXIDE 400 MG ORAL TABLET 400 MILLIGRAM(S): 241.3 TABLET ORAL at 14:50

## 2020-10-20 RX ADMIN — Medication 40 MILLIEQUIVALENT(S): at 14:50

## 2020-10-20 RX ADMIN — IRON SUCROSE 110 MILLIGRAM(S): 20 INJECTION, SOLUTION INTRAVENOUS at 11:39

## 2020-10-20 RX ADMIN — Medication 50 MILLIEQUIVALENT(S): at 14:03

## 2020-10-20 RX ADMIN — ATENOLOL 50 MILLIGRAM(S): 25 TABLET ORAL at 05:19

## 2020-10-20 RX ADMIN — Medication 81 MILLIGRAM(S): at 11:39

## 2020-10-20 RX ADMIN — Medication 20 MILLIGRAM(S): at 05:19

## 2020-10-20 RX ADMIN — Medication 100 GRAM(S): at 14:03

## 2020-10-20 NOTE — PROGRESS NOTE ADULT - SUBJECTIVE AND OBJECTIVE BOX
ORTHO PROGRESS NOTE    RUBEN RICE  [id number]    Patient seen and examined bedside. Pain well controlled on current regimen. Denies fever/chills/CP/SOB.    Vitals:  T(C): 36.2 (10-20-20 @ 05:04), Max: 36.2 (10-20-20 @ 05:04)  HR: 61 (10-20-20 @ 06:38) (61 - 74)  BP: 141/65 (10-20-20 @ 06:38) (141/65 - 194/79)  RR: 18 (10-20-20 @ 05:04) (18 - 18)  SpO2: --    PE:  NAD  Unlabored resp on RA  Resting comfortably    LLE:  Dressing with some staining proximally - changed  incision c/d/i w/o erythema or fluctuance  Compartments soft, compressible  SILT sp/dp/s/s/t  Motor intact ehl/fhl/ta/gs  DP palpable  wwp, bcr    Labs:                        9.6    6.72  )-----------( 165      ( 19 Oct 2020 06:10 )             28.3     10-19    128<L>  |  94<L>  |  4<L>  ----------------------------<  95  3.3<L>   |  23  |  <0.5<L>    Ca    8.0<L>      19 Oct 2020 06:10  Mg     1.4     10-19    TPro  5.2<L>  /  Alb  3.1<L>  /  TBili  0.6  /  DBili  x   /  AST  17  /  ALT  11  /  AlkPhos  65  10-19    LIVER FUNCTIONS - ( 19 Oct 2020 06:10 )  Alb: 3.1 g/dL / Pro: 5.2 g/dL / ALK PHOS: 65 U/L / ALT: 11 U/L / AST: 17 U/L / GGT: x             A/P: 75 year old Female s/p L hip IMN, doing well  - Pain control  - WBAT LLE  - DVT ppx: SCD, chem ppx  - labs  - IS  - Rehab/PT/OOB  - Dispo: Rehab facility likely today

## 2020-10-20 NOTE — DISCHARGE NOTE NURSING/CASE MANAGEMENT/SOCIAL WORK - PATIENT PORTAL LINK FT
You can access the FollowMyHealth Patient Portal offered by Health system by registering at the following website: [email or web address]. By joining [employer]’s FollowMyHealth portal, you will also be able to view your health information using other applications (apps) compatible with our system.

## 2020-10-20 NOTE — PROGRESS NOTE ADULT - SUBJECTIVE AND OBJECTIVE BOX
Patient is a 75 year old female with PMH of colon cancer s/p surgery, HTN, HLD, basal cell cancer (2011), who was admitted to [facility] after tripping over a rug and mechanical fall. Patient transferred from [facility] for orthopedic evaluation. Patient is s/p ORIF 10/16.   Pt was found to have iron deficiency started on  IV Venofer. Pt developed diarrhaea after Laxatives, she was started on IV fluids, Na level improved diarrhea is resolving will start Probiotics and replete electrolytes today.   Today pt c/o loose stool, denies abdominal pain, has left hip pain on ambulation able to participate with PT.     PAST MEDICAL & SURGICAL HISTORY:  Colon cancer  Arterial stenosis  High blood cholesterol  Hypertension  History of colon resection      ALLERGIES:  No Known Allergies    VITALS:  T(C): 36.3 (20 Oct 2020 13:08), Max: 36.3 (20 Oct 2020 13:08)  T(F): 97.4 (20 Oct 2020 13:08), Max: 97.4 (20 Oct 2020 13:08)  HR: 69 (20 Oct 2020 13:08) (61 - 74)  BP: 158/56 (20 Oct 2020 13:08) (141/65 - 194/79)  BP(mean): --  RR: 18 (20 Oct 2020 13:08) (18 - 18)      PHYSICAL EXAM:  GENERAL: NAD, pleasant, overweight   HEENT: Normocephalic, atraumatic, mucous membranes moist, PERRLA, neck supple, no JVD   PULMONARY: decreased BS at bases   CARDIOVASCULAR: Regular rate and rhythm, S1-S2.  GASTROINTESTINAL: Soft, non-tender, distended. Bowel sounds present   SKIN/EXTREMITIES: No clubbing or edema  NEUROLOGIC/MUSCULOSKELETAL: AOx3. No focal deficits, surgical dressing noted on left hip, no edema on LE     LABS:                                     9.1    4.66  )-----------( 175      ( 20 Oct 2020 11:59 )             27.0   10-20    129<L>  |  97<L>  |  5<L>  ----------------------------<  114<H>  3.0<L>   |  24  |  <0.5<L>    Ca    8.2<L>      20 Oct 2020 11:59  Mg     1.3     10-20    TPro  5.1<L>  /  Alb  2.9<L>  /  TBili  1.0  /  DBili  x   /  AST  25  /  ALT  13  /  AlkPhos  64  10-20          PT/INR - ( 16 Oct 2020 01:37 )   PT: 12.70 sec;   INR: 1.10 ratio    PTT - ( 16 Oct 2020 01:37 )  PTT:27.5 sec    IMAGING:                        CT Hip No Cont, Left (10.15.20 @ 14:36)  IMPRESSION:  Nondisplaced fracture ofthe greater trochanter with apparent nondisplaced extension involving 50% of the intertrochanteric ridge (along the posterior cortex).    < from: Xray Chest 1 View AP/PA (10.16.20 @ 08:27) >  Impression:    No radiographic evidence of acute cardiopulmonary disease.    < end of copied text >  < from: Xray Knee 3 Views, Left (10.15.20 @ 13:15) >  No fracture or dislocation or joint effusion is seen.  Impression no fractures seen in theleft knee    < end of copied text >    MEDICATIONS  (STANDING):  aspirin  chewable 81 milliGRAM(s) Oral daily  ATENolol  Tablet 50 milliGRAM(s) Oral daily  atorvastatin 20 milliGRAM(s) Oral at bedtime  enalapril 20 milliGRAM(s) Oral daily  enoxaparin Injectable 40 milliGRAM(s) SubCutaneous daily  magnesium oxide 400 milliGRAM(s) Oral daily  magnesium sulfate  IVPB 1 Gram(s) IV Intermittent once  multivitamin 1 Tablet(s) Oral daily  potassium chloride    Tablet ER 40 milliEquivalent(s) Oral once  potassium chloride  20 mEq/100 mL IVPB 20 milliEquivalent(s) IV Intermittent once    MEDICATIONS  (PRN):  acetaminophen   Tablet .. 650 milliGRAM(s) Oral every 6 hours PRN Temp greater or equal to 38C (100.4F), Mild Pain (1 - 3)  diphenhydrAMINE 25 milliGRAM(s) Oral at bedtime PRN Insomnia  morphine IVPB 4 milliGRAM(s) IV Intermittent every 4 hours PRN Severe Pain (7 - 10)  ondansetron Injectable 4 milliGRAM(s) IV Push every 6 hours PRN Nausea and/or Vomiting  oxyCODONE    IR 5 milliGRAM(s) Oral every 4 hours PRN Moderate Pain (4 - 6)

## 2020-10-20 NOTE — CHART NOTE - NSCHARTNOTEFT_GEN_A_CORE
discharge note     Patient is a 75 year old female with PMH of colon cancer s/p surgery, HTN, HLD, basal cell cancer (2011), who was admitted to [facility] after tripping over a rug and mechanical fall. Patient transferred from [facility] for orthopedic evaluation. Patient is s/p ORIF 10/16.   Pt was found to have iron deficiency started on  IV Venofer. Pt developed diarrhaea after Laxatives, she was started on IV fluids, Na level improved diarrhea is ed,  started probiotics and repleted electrolytes today.   Today patient denies abdominal pain, has left hip pain on ambulation able to participate with PT. She is medically stable and clear for dc  to [facility].

## 2020-10-20 NOTE — SBIRT NOTE ADULT - NSSBIRTUNABLESCROTHER_GEN_A_CORE
Patient stated that she has 1 or 2 drinks per month and adamantly denied it being a problem. Refused completion of SBIRT.

## 2020-10-20 NOTE — PROGRESS NOTE ADULT - ASSESSMENT
A/P: 75 year old Female with left greater trochanter fracture with intertrochanteric extension. Plan for ORIF today.  - consented  - pain control  - NWB/Bedrest  - CXR/EKG  - COVID test negative  - NPO/IVF  - medicine optimization/risk stratification; medically optimized  - DVT ppx: chem ppx, SCD's
ORTHO PROGRESS NOTE     75yFemale POD3 s/p left hip IMN    Patient seen and examined at bedside . The patient is awake and alert in NAD. No complaints of chest pain, SOB, N/V.    MEDICATIONS  (STANDING):  aspirin  chewable 81 milliGRAM(s) Oral daily  ATENolol  Tablet 50 milliGRAM(s) Oral daily  atorvastatin 20 milliGRAM(s) Oral at bedtime  enalapril 10 milliGRAM(s) Oral daily  enoxaparin Injectable 40 milliGRAM(s) SubCutaneous daily  iron sucrose IVPB 200 milliGRAM(s) IV Intermittent <User Schedule>  lactulose Syrup 10 Gram(s) Oral every 4 hours  multivitamin 1 Tablet(s) Oral daily    MEDICATIONS  (PRN):  acetaminophen   Tablet .. 650 milliGRAM(s) Oral every 6 hours PRN Temp greater or equal to 38C (100.4F), Mild Pain (1 - 3)  diphenhydrAMINE 25 milliGRAM(s) Oral at bedtime PRN Insomnia  morphine IVPB 4 milliGRAM(s) IV Intermittent every 4 hours PRN Severe Pain (7 - 10)  ondansetron Injectable 4 milliGRAM(s) IV Push every 6 hours PRN Nausea and/or Vomiting  oxyCODONE    IR 5 milliGRAM(s) Oral every 4 hours PRN Moderate Pain (4 - 6)      Vital Signs Last 24 Hrs  T(C): 36.3 (19 Oct 2020 04:45), Max: 36.5 (18 Oct 2020 21:13)  T(F): 97.4 (19 Oct 2020 04:45), Max: 97.7 (18 Oct 2020 21:13)  HR: 65 (19 Oct 2020 06:51) (65 - 77)  BP: 145/65 (19 Oct 2020 06:51) (112/58 - 204/83)  BP(mean): --  RR: 18 (19 Oct 2020 06:51) (18 - 20)  SpO2: 97% (19 Oct 2020 06:51) (97% - 97%)    PE:   Dressing C/D/I   Compartments soft and compressible  Motor intact distally  SILT distally  CR<2sec  palpable pulses                               9.6    6.72  )-----------( 165      ( 19 Oct 2020 06:10 )             28.3     10-18    129<L>  |  96<L>  |  4<L>  ----------------------------<  108<H>  3.4<L>   |  23  |  <0.5<L>    Ca    8.0<L>      18 Oct 2020 06:58  Mg     1.6     10-18    TPro  5.3<L>  /  Alb  3.1<L>  /  TBili  0.6  /  DBili  x   /  AST  19  /  ALT  14  /  AlkPhos  65  10-18              A/P: 75yFemale POD3 s/p left hip IMN, doing well.   OOB to Chair   WBAT  PT/OT  Pain control   Incentive Spirometry   DVT Prophylaxis   Discharge planning           
ORTHOPEDIC POST OP CHECK    POD0 s/p left hip IMN.  S/e at bedside.  Doing well, pain controlled.  Eating.  Denies fevers, numbness/tingling. No other complaints.    T(C): 36.9 (10-16-20 @ 16:25), Max: 37 (10-16-20 @ 14:20)  HR: 62 (10-16-20 @ 16:25) (58 - 82)  BP: 160/64 (10-16-20 @ 16:25) (110/53 - 161/71)  RR: 18 (10-16-20 @ 16:25) (16 - 22)  SpO2: 97% (10-16-20 @ 16:25) (96% - 100%)    Gen: awake alert NAD    LLE: dressing c/d/i, SILT s/s/sp/dp/t, motor intact TA/EHL/GS, digits wwp        A/P 75yFemale POD0 s/p left hip IMN doing well  - WBAT  - pain control  - postop abx  - DVT prophylaxis  - IS encouraged  - AM labs  - PT/rehab  - D/c planning
ORTHOPEDIC PROGRESS    POD1 s/p left hip IMN.  S/e at bedside.  Doing well, pain controlled.  Eating.  Denies fevers, numbness/tingling. No other complaints.    Vital Signs Last 24 Hrs  T(C): 36.6 (18 Oct 2020 05:05), Max: 36.6 (18 Oct 2020 05:05)  T(F): 97.9 (18 Oct 2020 05:05), Max: 97.9 (18 Oct 2020 05:05)  HR: 83 (18 Oct 2020 05:05) (72 - 83)  BP: 156/83 (18 Oct 2020 05:05) (140/89 - 156/83)  BP(mean): --  RR: 20 (18 Oct 2020 05:05) (20 - 20)  SpO2: --    Gen: awake alert NAD    LLE: dressing w/ minimal serosanguinous staining proximally  Changed this AM, incisions w/ staples in place, clean  SILT s/s/sp/dp/t  motor intact TA/EHL/GS  digits wwp                                       10.2   8.81  )-----------( 167      ( 17 Oct 2020 06:17 )             30.5       A/P 75yFemale POD2 s/p left hip IMN doing well    - WBAT  - pain control  - DVT prophylaxis  - IS encouraged  - trend H/H, transfuse PRN   - PT/rehab  - D/c planning
ORTHOPEDIC PROGRESS    POD1 s/p left hip IMN.  S/e at bedside.  Doing well, pain controlled.  Eating.  Denies fevers, numbness/tingling. No other complaints.    Vital Signs Last 24 Hrs  T(C): 36.9 (17 Oct 2020 04:45), Max: 37.4 (16 Oct 2020 20:32)  T(F): 98.4 (17 Oct 2020 04:45), Max: 99.3 (16 Oct 2020 20:32)  HR: 76 (17 Oct 2020 04:45) (58 - 79)  BP: 145/72 (17 Oct 2020 04:45) (126/60 - 161/71)  BP(mean): --  RR: 20 (17 Oct 2020 04:45) (16 - 22)  SpO2: 96% (17 Oct 2020 00:24) (94% - 100%)    Gen: awake alert NAD    LLE: dressing c/d/i  SILT s/s/sp/dp/t  motor intact TA/EHL/GS  digits wwp                            10.2   8.81  )-----------( 167      ( 17 Oct 2020 06:17 )             30.5     A/P 75yFemale POD1 s/p left hip IMN doing well  - WBAT  - pain control  - postop abx  - DVT prophylaxis  - IS encouraged  - AM labs  - PT/rehab  - D/c planning
 Patient is s/p ORIF 10/16.Today is postop day # 3. Patient  complains of mild   left hip pain. No other issues overnight         # Mechanical fall w L hip fracture   POD3 s/p left hip IMN, doing well.   OOB to Chair   WBAT  PT/OT  Pain control   Incentive Spirometry   DVT Prophylaxis   Discharge planning       # Constipation   - high fiber diet   - add Miralax, Senna and Lactulose     # HTN / HLD  - DASH diet   - cont atenolol, enalapril, statin    # Chronic  Hyponatremia   - stable  - check TSH,FT4   - monitor Na level      # Iron deficiency with acute blood  Anemia  - start IV Venofer while in the hospital   - monitor H/H, keep Hb above 7.5 and active type/cross     # h/o Colon CA  -  in remission   - check stool for occult blood     DVT ppx: SCD and lovenox   GI ppx: not necessary  Diet: DASH/DIET  Code: Full  Pending: possible DC to [facility] today after covid results back. 
Patient is a 75 year old female with PMH of colon cancer s/p surgery, HTN, HLD, basal cell cancer (2011), who was admitted to [facility] after tripping over a rug and mechanical fall. Patient transferred from [facility] for orthopedic evaluation. Patient is s/p ORIF 10/16.      A/P     # Diarrhea/ hypovolemic hyponatremia with h/o chronic hyponatremia   - likely after Laxatives   - pt was not on ABx during this admission   - Na level improved after IV hydration   - d/c IV fluids, encourage po hydration   - replete potassium and magnesium   - start Probiotics     # Mechanical fall w L hip fracture   - ortho is following up, recommendations noted   - s/p ORIF on 10/16  - WBAT  - PT/Rehab   - pain controlled now   - incentive spirometry   - DVT prophylaxis     # Constipation   - resolved after Laxatives     # HTN / HLD  - DASH diet   - cont atenolol, enalapril, statin      # Iron deficiency with acute blood  Anemia  - on IV Venofer   - monitor H/H, keep Hb above 7.5 and active type/cross     # h/o Colon CA  -  in remission   - check stool for occult blood     # replete electrolytes Mg and potassium     DVT ppx: SCD and lovenox   GI ppx: not necessary  Diet: DASH/DIET  Code: Full    #Progress Note Handoff  Pending (specify):  replete Mg and potassium, start Probiotics, encourage po hydration   Family discussion: I spoke with pt, she agreed with a plan of care   Disposition: Home___/SNF___/Other________/Unknown at this time__x ______  
Patient is a 75 year old female with PMH of colon cancer s/p surgery, HTN, HLD, basal cell cancer (2011), who was admitted to [facility] after tripping over a rug and mechanical fall. Patient transferred from [facility] for orthopedic evaluation. Patient is s/p ORIF 10/16.      A/P   # Mechanical fall w L hip fracture   - ortho is following up, recommendations noted   - s/p ORIF on 10/16  - WBAT  - PT/Rehab   - pain controlled now   - incentive spirometry   - DVT prophylaxis     # Constipation   - resolved   - hold Laxatives     # HTN / HLD  - DASH diet   - cont atenolol, enalapril, statin    # Chronic  Hyponatremia   - start IV fluids for 24 hours ( pt developed diarrhea after Laxatives)   - stable  - check TSH,FT4   - monitor Na level      # Iron deficiency with acute blood  Anemia  - on IV Venofer   - monitor H/H, keep Hb above 7.5 and active type/cross     # h/o Colon CA  -  in remission   - check stool for occult blood     # replete electrolytes Mg and potassium     DVT ppx: SCD and lovenox   GI ppx: not necessary  Diet: DASH/DIET  Code: Full    #Progress Note Handoff  Pending (specify):  start IV fluids, replete electrolytes, PT as tolerated, d/c Laxatives   Family discussion: I spoke with pt, she agreed with a plan of care   Disposition: Home___/SNF___/Other________/Unknown at this time__x ______  
Patient is a 75 year old female with PMH of colon cancer s/p surgery, HTN, HLD, basal cell cancer (2011), who was admitted to [facility] after tripping over a rug and mechanical fall. Patient transferred from [facility] for orthopedic evaluation. Patient is s/p ORIF 10/16.      A/P   # Mechanical fall w L hip fracture   - ortho is following up, recommendations noted   - s/p ORIF on 10/16  - WBAT  - PT/Rehab   - pain control, will add Morphine PRN today  - incentive spirometry   - DVT prophylaxis     # Constipation   - high fiber diet   - add Miralax, Senna and Lactulose     # HTN / HLD  - DASH diet   - cont atenolol, enalapril, statin    # Chronic  Hyponatremia   - stable  - check TSH,FT4   - monitor Na level      # Iron deficiency with acute blood  Anemia  - start IV Venofer while in the hospital   - monitor H/H, keep Hb above 7.5 and active type/cross     # h/o Colon CA  -  in remission   - check stool for occult blood     DVT ppx: SCD and lovenox   GI ppx: not necessary  Diet: DASH/DIET  Code: Full    #Progress Note Handoff  Pending (specify):  incentive spirometry,  check stool for occult blood , TSH, FT4, urine osmolarity, OOB to chair, Pt/Rehab, add Morphine PRN for pain , start IV Venofer , treat constipation, anticipate discharge in 24 hours   Family discussion: I spoke with pt, she agreed with a plan of care   Disposition: Home___/SNF___/Other________/Unknown at this time__x ______  
Patient is a 75 year old female with PMH of colon cancer s/p surgery, HTN, HLD, basal cell cancer (2011), who was admitted to [facility] after tripping over a rug and mechanical fall. Patient transferred from [facility] for orthopedic evaluation. Patient is s/p ORIF 10/16.      A/P   # Mechanical fall w L hip fracture   - pt was consulted by ortho   - s/p ORIF on 10/16  - WBAT  - PT/Rehab   - pain control  incentive spirometry   - DVT prophylaxis     # HTN / HLD  - DASH diet   - cont atenolol, enalapril, statin    # Chronic  Hyponatremia   - stable  - check TSH,FT4   - monitor Na level  - check urine osmolarity   - maintain fluid balance     # Anemia  - send anemia work up  - monitor H/H, keep Hb above 7.5 and active type/cross     # h/o Colon CA  -  in remission   - check stool for occult blood     DVT ppx: SCD and lovenox   GI ppx: not necessary  Diet: DASH/DIET  Code: Full    #Progress Note Handoff  Pending (specify):  incentive spirometry, anemia work up, check stool for occult blood , TSH, FT4, urine osmolarity, OOB to chair, Pt/Rehab, pain control    Family discussion: I spoke with pt, she agreed with a plan of care   Disposition: Home___/SNF___/Other________/Unknown at this time__x ______  
Patient is a 75 year old female with PMH of colon cancer s/p surgery, HTN, HLD, basal cell cancer (2011), who was admitted to [facility] after tripping over a rug and mechanical fall. Patient transferred from [facility] for orthopedic evaluation. Patient is s/p ORIF 10/16.    # Mechanical fall w L hip fracture s/p ORIF  - pain control  - f/u PT/OT post Sx     # HTN / HLD  - cont atenolol, enalapril, statin    # Hyponatremia (asymptomatic) - stable    Activity: bedrest, NWB  DVT ppx: SCD  GI ppx: not necessary  Diet: DASH/DIET  Code: Full  Dispo: medical optimization, pt, d/c monday

## 2020-10-20 NOTE — PROGRESS NOTE ADULT - REASON FOR ADMISSION
mechanical fall

## 2020-10-28 DIAGNOSIS — R19.7 DIARRHEA, UNSPECIFIED: ICD-10-CM

## 2020-10-28 DIAGNOSIS — I77.1 STRICTURE OF ARTERY: ICD-10-CM

## 2020-10-28 DIAGNOSIS — E86.1 HYPOVOLEMIA: ICD-10-CM
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
## 2020-10-28 DIAGNOSIS — I10 ESSENTIAL (PRIMARY) HYPERTENSION: ICD-10-CM

## 2020-10-28 DIAGNOSIS — D62 ACUTE POSTHEMORRHAGIC ANEMIA: ICD-10-CM

## 2020-10-28 DIAGNOSIS — S72.145A NONDISPLACED INTERTROCHANTERIC FRACTURE OF LEFT FEMUR, INITIAL ENCOUNTER FOR CLOSED FRACTURE: ICD-10-CM
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
## 2020-10-28 DIAGNOSIS — Z85.038 PERSONAL HISTORY OF OTHER MALIGNANT NEOPLASM OF LARGE INTESTINE: ICD-10-CM

## 2020-10-28 DIAGNOSIS — E78.00 PURE HYPERCHOLESTEROLEMIA, UNSPECIFIED: ICD-10-CM

## 2020-10-28 DIAGNOSIS — Z87.891 PERSONAL HISTORY OF NICOTINE DEPENDENCE: ICD-10-CM

## 2020-10-28 DIAGNOSIS — K59.00 CONSTIPATION, UNSPECIFIED: ICD-10-CM

## 2020-10-28 DIAGNOSIS — S72.142A DISPLACED INTERTROCHANTERIC FRACTURE OF LEFT FEMUR, INITIAL ENCOUNTER FOR CLOSED FRACTURE: ICD-10-CM

## 2020-10-28 DIAGNOSIS — E78.5 HYPERLIPIDEMIA, UNSPECIFIED: ICD-10-CM

## 2020-10-28 DIAGNOSIS — E87.1 HYPO-OSMOLALITY AND HYPONATREMIA: ICD-10-CM

## 2021-10-22 NOTE — OCCUPATIONAL THERAPY INITIAL EVALUATION ADULT - ORIENTATION, REHAB EVAL
oriented to person, place, time and situation Benzoyl Peroxide Pregnancy And Lactation Text: This medication is Pregnancy Category C. It is unknown if benzoyl peroxide is excreted in breast milk.

## 2021-10-26 NOTE — PATIENT PROFILE ADULT - NSPROGENDIFFINTUB_GEN_A_NUR
removed pt's arterial line in the right radial artery. bleeding controlled and pt tolerated 
procedure well. catheter remained in tact after removal Detail Level: Detailed Depth Of Biopsy: dermis Was A Bandage Applied: Yes Size Of Lesion In Cm: 1.1 X Size Of Lesion In Cm: 0.9 Biopsy Type: H and E Biopsy Method: Dermablade Anesthesia Type: 1% lidocaine with epinephrine Anesthesia Volume In Cc (Will Not Render If 0): 1 Additional Anesthesia Volume In Cc (Will Not Render If 0): 0 Hemostasis: Electrocautery Wound Care: Bacitracin Dressing: bandage Type Of Destruction Used: Electrodesiccation Curettage Text: The wound bed was treated with curettage after the biopsy was performed. Cryotherapy Text: The wound bed was treated with cryotherapy after the biopsy was performed. Electrodesiccation Text: The wound bed was treated with electrodesiccation after the biopsy was performed. Electrodesiccation And Curettage Text: The wound bed was treated with electrodesiccation and curettage after the biopsy was performed. Silver Nitrate Text: The wound bed was treated with silver nitrate after the biopsy was performed. Lab: [id number] Lab Facility: [id number] [address] Notes In Note?: No never intubated Consent was obtained and risks were reviewed including but not limited to scarring, infection, bleeding, scabbing, incomplete removal, nerve damage and allergy to anesthesia. Post-Care Instructions: I reviewed with the patient in detail post-care instructions. Patient is to keep the biopsy site dry for 24-48 hours. Patient may then begin vinegar compresses twice daily for 1 week, then once daily for 1 week.  After soaking, they may apply Bacitracin or Vaseline. Notification Instructions: Patient will be notified of biopsy results. However, patient instructed to call the office if not contacted within 2 weeks. Billing Type: Third-Party Bill Information: Selecting Yes will display possible errors in your note based on the variables you have selected. This validation is only offered as a suggestion for you. PLEASE NOTE THAT THE VALIDATION TEXT WILL BE REMOVED WHEN YOU FINALIZE YOUR NOTE. IF YOU WANT TO FAX A PRELIMINARY NOTE YOU WILL NEED TO TOGGLE THIS TO 'NO' IF YOU DO NOT WANT IT IN YOUR FAXED NOTE. Lab: [id number] Lab Facility: [id number] Post-Care Instructions: I reviewed with the patient in detail post-care instructions. Patient is to keep the biopsy site dry for 24-48 hours. Patient may then begin vinegar compresses twice daily for 1 week, then once daily for 1 week.  After soaking, they may apply Bacitracin or Vaseline. Billing Type: Third-Party Bill Size Of Lesion In Cm: 2.2 X Size Of Lesion In Cm: 1.2 Size Of Lesion In Cm: 0.7

## 2022-01-17 NOTE — PATIENT PROFILE ADULT - CHOOSE INDICATION TO IMMUNIZE (AN ORDER WILL BE GENERATED WHEN THIS NOTE IS SAVED):
No respiratory distress. No stridor, Lungs sounds clear with good aeration bilaterally. Patient is not pregnant (male or female)

## 2022-02-11 NOTE — ED ADULT NURSE NOTE - PRIMARY CARE PROVIDER
Anticoagulation Summary  As of 2/11/2022    INR goal:  2.0-3.0   TTR:  83.8 % (1.2 mo)   INR used for dosing:     Warfarin maintenance plan:  7 mg (2 mg x 1 and 5 mg x 1) every day   Weekly warfarin total:  49 mg   Weekly max warfarin dose:  5 mg   Plan last modified:  Nyla Donaldson RN (1/14/2022)   Next INR check:  3/11/2022   Priority:  Follow-Up - 4 Weeks   Target end date:  Indefinite         Anticoagulation Episode Summary     INR check location:      Preferred lab:      Send INR reminders to:  LEANNA HERNANDEZ ONC [facility] RESULT POOL    Comments:          
lombardi

## 2022-03-28 PROBLEM — E78.49 OTHER HYPERLIPIDEMIA: Chronic | Status: ACTIVE | Noted: 2019-10-09

## 2022-03-28 PROBLEM — I10 ESSENTIAL (PRIMARY) HYPERTENSION: Chronic | Status: ACTIVE | Noted: 2019-10-09

## 2022-03-28 PROBLEM — C18.6 MALIGNANT NEOPLASM OF DESCENDING COLON: Chronic | Status: ACTIVE | Noted: 2019-10-09

## 2022-03-28 PROBLEM — C44.319 BASAL CELL CARCINOMA OF SKIN OF OTHER PARTS OF FACE: Chronic | Status: ACTIVE | Noted: 2019-10-09

## 2022-03-28 PROBLEM — Z86.69 PERSONAL HISTORY OF OTHER DISEASES OF THE NERVOUS SYSTEM AND SENSE ORGANS: Chronic | Status: ACTIVE | Noted: 2019-10-09

## 2022-03-28 RX ORDER — ATENOLOL 25 MG/1
0 TABLET ORAL
Qty: 0 | Refills: 0 | DISCHARGE

## 2022-03-28 RX ORDER — ATENOLOL 25 MG/1
1 TABLET ORAL
Qty: 0 | Refills: 0 | DISCHARGE

## 2022-03-28 RX ORDER — ASPIRIN/CALCIUM CARB/MAGNESIUM 324 MG
0 TABLET ORAL
Qty: 0 | Refills: 0 | DISCHARGE

## 2022-03-28 RX ORDER — ATORVASTATIN CALCIUM 80 MG/1
0 TABLET, FILM COATED ORAL
Qty: 0 | Refills: 0 | DISCHARGE

## 2022-03-28 RX ORDER — ATORVASTATIN CALCIUM 80 MG/1
1 TABLET, FILM COATED ORAL
Qty: 0 | Refills: 0 | DISCHARGE

## 2022-04-11 NOTE — ED PROVIDER NOTE - DISPOSITION TYPE
ADMIT Topical Retinoid counseling:  Patient advised to apply a pea-sized amount only at bedtime and wait 30 minutes after washing their face before applying.  If too drying, patient may add a non-comedogenic moisturizer. The patient verbalized understanding of the proper use and possible adverse effects of retinoids.  All of the patient's questions and concerns were addressed.

## 2022-05-13 PROBLEM — Z00.00 ENCOUNTER FOR PREVENTIVE HEALTH EXAMINATION: Status: ACTIVE | Noted: 2022-05-13

## 2022-09-14 NOTE — ED PROVIDER NOTE - INPATIENT RESIDENT/ACP NOTIFIED DATE
Silas Cho (MD)  Obstetrics and Gynecology  [address] [place] [address], [address]  [place] [address]  Phone: [phone]  Fax: [phone]  Follow Up Time:    15-Oct-2020 15:12

## 2023-08-08 NOTE — PHYSICAL THERAPY INITIAL EVALUATION ADULT - GENERAL OBSERVATIONS, REHAB EVAL
10:00-10:10. Pt encountered semifowler in bed sleeping in NAD, +sequentials B LE's, awoken by PT for PT Eval and OOB activity. Pt declined PT stating she is not feeling well, feel nauseus/sore in abdomen/ribs , coughing a lot and has +phlegm. Pt educated on importance of PT and OOB activity, however declined PT at this time stating she needs to rest. Will f/u with PT as appropriate.
PT IE completed. 1055-1125am. Patient encountered semifowlers in bed, in NAD, +IV lock, +primafit, agreeable to therapy. Patient needs Min A supine to sit, sit<>stand, was able to take 5 steps to chair using rolling walker with Min A. Patient refused to ambulate further due to her knees feel like about to buckle. C/o of minimal and tolerable pain.
,

## 2023-11-28 NOTE — PHYSICAL THERAPY INITIAL EVALUATION ADULT - AMBULATION SKILLS, REHAB EVAL
Spiritual Plan of Care    Pt Name: Ann-Marie Reeder  Pt : 1938  Date: 2023    Visit Type: In person    Referral Source: Interdisciplinary team    Reason for Visit: Spiritual Assessment    Visited With: Patient, Family/Friend    Length of Visit: 30 minutes    Spiritual Care Consult Needed: Spiritual Care eval completed    Spiritual Care Visit Preference:     Taxonomy:    · Intended Effects: Build relationship of care and support, Convey a calming presence, Arin Affirmation, Helping someone feel comforted, Lessen anxiety, Promote a sense of peace  · Methods: Assist with finding purpose, Encourage sharing of feelings, Encourage story-telling, Exploring hope  · Interventions: Acknowledge current situation, Active listening, Ask guided questions, Ask guided questions about arin, Ask guided questions about purpose, Prayer for healing, Share words of hope and inspiration      Patient Affect at Time of Visit: Alert, Expressive, Cooperative, Pleasant, Open to  Visit  Patient Assessment: At peace, Confident, Hopeful, Relies on arin  Patient  Intervention: Advocate, Affirmation, Emotional Support, Empathic Listening, Prayer, Reassurance    Pt was alert and oriented, receptive of the 's visit.  Pt wanted to receive prayer and blessings from a .   provided pastoral prayer and words of hope and comfort.  Pt claimed having support of family, friends, and arin community.    Spiritual Plan of Care: Follow up if requested    Patient Reported Outcome:  Increased sense of hope    Rev. Patti Voss MDiv. [facility]  Senior Staff   [facility]  Connect to the switchboard to reach  ON-Call     rolling walker / standard cane/independent/needs device